# Patient Record
Sex: FEMALE | Race: BLACK OR AFRICAN AMERICAN | NOT HISPANIC OR LATINO
[De-identification: names, ages, dates, MRNs, and addresses within clinical notes are randomized per-mention and may not be internally consistent; named-entity substitution may affect disease eponyms.]

---

## 2017-09-06 ENCOUNTER — APPOINTMENT (OUTPATIENT)
Dept: BARIATRICS | Facility: CLINIC | Age: 57
End: 2017-09-06
Payer: SELF-PAY

## 2017-09-06 VITALS
SYSTOLIC BLOOD PRESSURE: 116 MMHG | HEIGHT: 66 IN | DIASTOLIC BLOOD PRESSURE: 70 MMHG | BODY MASS INDEX: 39.05 KG/M2 | WEIGHT: 243 LBS

## 2017-09-06 PROCEDURE — 99214 OFFICE O/P EST MOD 30 MIN: CPT | Mod: 25

## 2017-09-06 PROCEDURE — S2083 ADJUSTMENT GASTRIC BAND: CPT

## 2018-10-08 ENCOUNTER — NON-APPOINTMENT (OUTPATIENT)
Age: 58
End: 2018-10-08

## 2018-10-08 ENCOUNTER — LABORATORY RESULT (OUTPATIENT)
Age: 58
End: 2018-10-08

## 2018-10-08 ENCOUNTER — APPOINTMENT (OUTPATIENT)
Dept: INTERNAL MEDICINE | Facility: CLINIC | Age: 58
End: 2018-10-08
Payer: COMMERCIAL

## 2018-10-08 VITALS
HEART RATE: 87 BPM | TEMPERATURE: 98.7 F | OXYGEN SATURATION: 98 % | BODY MASS INDEX: 40.34 KG/M2 | RESPIRATION RATE: 14 BRPM | WEIGHT: 251 LBS | DIASTOLIC BLOOD PRESSURE: 80 MMHG | HEIGHT: 66 IN | SYSTOLIC BLOOD PRESSURE: 120 MMHG

## 2018-10-08 PROCEDURE — 99386 PREV VISIT NEW AGE 40-64: CPT | Mod: 25

## 2018-10-08 PROCEDURE — 93000 ELECTROCARDIOGRAM COMPLETE: CPT

## 2018-10-08 NOTE — HEALTH RISK ASSESSMENT
[Patient reported mammogram was normal] : Patient reported mammogram was normal [Patient reported colonoscopy was normal] : Patient reported colonoscopy was normal [MammogramDate] : 06/16 [ColonoscopyDate] : 06/15 [ColonoscopyComments] : repeat 5 yrs [] : No [No falls in past year] : Patient reported no falls in the past year

## 2018-10-08 NOTE — HISTORY OF PRESENT ILLNESS
[FreeTextEntry1] : cpe [de-identified] : Pt is here for cpe. She is upset because her  has Parkinson's. He is a physician and not able to work.

## 2018-10-08 NOTE — HISTORY OF PRESENT ILLNESS
[FreeTextEntry1] : cpe [de-identified] : Pt is here for cpe. She is upset because her  has Parkinson's. He is a physician and not able to work.

## 2018-10-08 NOTE — PHYSICAL EXAM
[de-identified] : tearful when talking about her  [No Acute Distress] : no acute distress [Well Nourished] : well nourished [Well Developed] : well developed [Well-Appearing] : well-appearing [Normal Sclera/Conjunctiva] : normal sclera/conjunctiva [PERRL] : pupils equal round and reactive to light [EOMI] : extraocular movements intact [Normal Outer Ear/Nose] : the outer ears and nose were normal in appearance [Normal Oropharynx] : the oropharynx was normal [Normal TMs] : both tympanic membranes were normal [No JVD] : no jugular venous distention [Supple] : supple [No Lymphadenopathy] : no lymphadenopathy [Thyroid Normal, No Nodules] : the thyroid was normal and there were no nodules present [No Respiratory Distress] : no respiratory distress  [Clear to Auscultation] : lungs were clear to auscultation bilaterally [No Accessory Muscle Use] : no accessory muscle use [Normal Rate] : normal rate  [Regular Rhythm] : with a regular rhythm [Normal S1, S2] : normal S1 and S2 [No Murmur] : no murmur heard [Pedal Pulses Present] : the pedal pulses are present [No Edema] : there was no peripheral edema [Soft] : abdomen soft [Non Tender] : non-tender [No CVA Tenderness] : no CVA  tenderness [No Spinal Tenderness] : no spinal tenderness [No Joint Swelling] : no joint swelling [Grossly Normal Strength/Tone] : grossly normal strength/tone [No Rash] : no rash [Normal Gait] : normal gait [Coordination Grossly Intact] : coordination grossly intact [No Focal Deficits] : no focal deficits [Deep Tendon Reflexes (DTR)] : deep tendon reflexes were 2+ and symmetric [Normal Affect] : the affect was normal [Normal Insight/Judgement] : insight and judgment were intact

## 2018-10-08 NOTE — PHYSICAL EXAM
[de-identified] : tearful when talking about her  [No Acute Distress] : no acute distress [Well Nourished] : well nourished [Well Developed] : well developed [Well-Appearing] : well-appearing [Normal Sclera/Conjunctiva] : normal sclera/conjunctiva [PERRL] : pupils equal round and reactive to light [EOMI] : extraocular movements intact [Normal Outer Ear/Nose] : the outer ears and nose were normal in appearance [Normal Oropharynx] : the oropharynx was normal [Normal TMs] : both tympanic membranes were normal [No JVD] : no jugular venous distention [Supple] : supple [No Lymphadenopathy] : no lymphadenopathy [Thyroid Normal, No Nodules] : the thyroid was normal and there were no nodules present [No Respiratory Distress] : no respiratory distress  [Clear to Auscultation] : lungs were clear to auscultation bilaterally [No Accessory Muscle Use] : no accessory muscle use [Normal Rate] : normal rate  [Regular Rhythm] : with a regular rhythm [Normal S1, S2] : normal S1 and S2 [No Murmur] : no murmur heard [Pedal Pulses Present] : the pedal pulses are present [No Edema] : there was no peripheral edema [Soft] : abdomen soft [Non Tender] : non-tender [No CVA Tenderness] : no CVA  tenderness [No Spinal Tenderness] : no spinal tenderness [No Joint Swelling] : no joint swelling [Grossly Normal Strength/Tone] : grossly normal strength/tone [No Rash] : no rash [Normal Gait] : normal gait [Coordination Grossly Intact] : coordination grossly intact [No Focal Deficits] : no focal deficits [Deep Tendon Reflexes (DTR)] : deep tendon reflexes were 2+ and symmetric [Normal Affect] : the affect was normal [Normal Insight/Judgement] : insight and judgment were intact

## 2018-10-09 LAB
25(OH)D3 SERPL-MCNC: 13.5 NG/ML
ALBUMIN SERPL ELPH-MCNC: 4.3 G/DL
ALP BLD-CCNC: 83 U/L
ALT SERPL-CCNC: 11 U/L
ANION GAP SERPL CALC-SCNC: 14 MMOL/L
APPEARANCE: CLEAR
AST SERPL-CCNC: 19 U/L
BASOPHILS # BLD AUTO: 0.02 K/UL
BASOPHILS NFR BLD AUTO: 0.3 %
BILIRUB SERPL-MCNC: 0.2 MG/DL
BILIRUBIN URINE: NEGATIVE
BLOOD URINE: ABNORMAL
BUN SERPL-MCNC: 12 MG/DL
CALCIUM SERPL-MCNC: 9.9 MG/DL
CHLORIDE SERPL-SCNC: 102 MMOL/L
CHOLEST SERPL-MCNC: 180 MG/DL
CHOLEST/HDLC SERPL: 3.3 RATIO
CO2 SERPL-SCNC: 27 MMOL/L
COLOR: YELLOW
CREAT SERPL-MCNC: 1.03 MG/DL
EOSINOPHIL # BLD AUTO: 0.14 K/UL
EOSINOPHIL NFR BLD AUTO: 2.2 %
FOLATE SERPL-MCNC: 18.5 NG/ML
GLUCOSE QUALITATIVE U: NEGATIVE MG/DL
GLUCOSE SERPL-MCNC: 78 MG/DL
HBA1C MFR BLD HPLC: 5.8 %
HCT VFR BLD CALC: 37.9 %
HCV AB SER QL: NONREACTIVE
HCV S/CO RATIO: 0.07 S/CO
HDLC SERPL-MCNC: 55 MG/DL
HGB BLD-MCNC: 12.3 G/DL
IMM GRANULOCYTES NFR BLD AUTO: 0.2 %
KETONES URINE: ABNORMAL
LDLC SERPL CALC-MCNC: 111 MG/DL
LEUKOCYTE ESTERASE URINE: ABNORMAL
LYMPHOCYTES # BLD AUTO: 2.49 K/UL
LYMPHOCYTES NFR BLD AUTO: 39 %
MAN DIFF?: NORMAL
MCHC RBC-ENTMCNC: 27 PG
MCHC RBC-ENTMCNC: 32.5 GM/DL
MCV RBC AUTO: 83.1 FL
MONOCYTES # BLD AUTO: 0.54 K/UL
MONOCYTES NFR BLD AUTO: 8.5 %
NEUTROPHILS # BLD AUTO: 3.18 K/UL
NEUTROPHILS NFR BLD AUTO: 49.8 %
NITRITE URINE: NEGATIVE
PH URINE: 5.5
PLATELET # BLD AUTO: 285 K/UL
POTASSIUM SERPL-SCNC: 3.7 MMOL/L
PROT SERPL-MCNC: 8.1 G/DL
PROTEIN URINE: NEGATIVE MG/DL
RBC # BLD: 4.56 M/UL
RBC # FLD: 15.2 %
SODIUM SERPL-SCNC: 143 MMOL/L
SPECIFIC GRAVITY URINE: 1.03
TRIGL SERPL-MCNC: 70 MG/DL
TSH SERPL-ACNC: 1.25 UIU/ML
URATE SERPL-MCNC: 7 MG/DL
UROBILINOGEN URINE: NEGATIVE MG/DL
VIT B12 SERPL-MCNC: 861 PG/ML
WBC # FLD AUTO: 6.38 K/UL

## 2018-10-12 ENCOUNTER — TRANSCRIPTION ENCOUNTER (OUTPATIENT)
Age: 58
End: 2018-10-12

## 2018-10-15 ENCOUNTER — APPOINTMENT (OUTPATIENT)
Dept: INTERNAL MEDICINE | Facility: CLINIC | Age: 58
End: 2018-10-15

## 2018-10-17 LAB — HEMOCCULT STL QL IA: NEGATIVE

## 2018-10-26 ENCOUNTER — APPOINTMENT (OUTPATIENT)
Dept: INTERNAL MEDICINE | Facility: CLINIC | Age: 58
End: 2018-10-26

## 2018-10-29 ENCOUNTER — MEDICATION RENEWAL (OUTPATIENT)
Age: 58
End: 2018-10-29

## 2018-11-16 ENCOUNTER — APPOINTMENT (OUTPATIENT)
Dept: INTERNAL MEDICINE | Facility: CLINIC | Age: 58
End: 2018-11-16

## 2018-11-16 ENCOUNTER — MEDICATION RENEWAL (OUTPATIENT)
Age: 58
End: 2018-11-16

## 2018-12-14 ENCOUNTER — RX RENEWAL (OUTPATIENT)
Age: 58
End: 2018-12-14

## 2019-01-25 ENCOUNTER — APPOINTMENT (OUTPATIENT)
Dept: INTERNAL MEDICINE | Facility: CLINIC | Age: 59
End: 2019-01-25

## 2019-01-30 ENCOUNTER — MEDICATION RENEWAL (OUTPATIENT)
Age: 59
End: 2019-01-30

## 2019-01-30 ENCOUNTER — TRANSCRIPTION ENCOUNTER (OUTPATIENT)
Age: 59
End: 2019-01-30

## 2019-02-04 ENCOUNTER — TRANSCRIPTION ENCOUNTER (OUTPATIENT)
Age: 59
End: 2019-02-04

## 2019-02-26 ENCOUNTER — RX RENEWAL (OUTPATIENT)
Age: 59
End: 2019-02-26

## 2019-03-01 ENCOUNTER — APPOINTMENT (OUTPATIENT)
Dept: INTERNAL MEDICINE | Facility: CLINIC | Age: 59
End: 2019-03-01

## 2019-03-05 ENCOUNTER — TRANSCRIPTION ENCOUNTER (OUTPATIENT)
Age: 59
End: 2019-03-05

## 2019-03-11 ENCOUNTER — MEDICATION RENEWAL (OUTPATIENT)
Age: 59
End: 2019-03-11

## 2019-03-15 ENCOUNTER — APPOINTMENT (OUTPATIENT)
Dept: INTERNAL MEDICINE | Facility: CLINIC | Age: 59
End: 2019-03-15

## 2019-03-20 ENCOUNTER — APPOINTMENT (OUTPATIENT)
Dept: INTERNAL MEDICINE | Facility: CLINIC | Age: 59
End: 2019-03-20
Payer: COMMERCIAL

## 2019-03-20 VITALS
WEIGHT: 218 LBS | SYSTOLIC BLOOD PRESSURE: 114 MMHG | OXYGEN SATURATION: 97 % | RESPIRATION RATE: 14 BRPM | HEART RATE: 84 BPM | HEIGHT: 66 IN | TEMPERATURE: 98.4 F | BODY MASS INDEX: 35.03 KG/M2 | DIASTOLIC BLOOD PRESSURE: 72 MMHG

## 2019-03-20 DIAGNOSIS — G47.00 INSOMNIA, UNSPECIFIED: ICD-10-CM

## 2019-03-20 PROCEDURE — 99214 OFFICE O/P EST MOD 30 MIN: CPT

## 2019-03-20 RX ORDER — TRAZODONE HYDROCHLORIDE 50 MG/1
50 TABLET ORAL
Qty: 30 | Refills: 2 | Status: DISCONTINUED | COMMUNITY
Start: 2019-03-11 | End: 2019-03-20

## 2019-03-20 NOTE — ASSESSMENT
[FreeTextEntry1] : obesity\par losing weight with weight watchers and walking with coworkers\par \par anxiety \par much improved\par \par insomnia\par wants lorazepam renewed\par \par HTN\par lower BP medication

## 2019-03-20 NOTE — HISTORY OF PRESENT ILLNESS
[FreeTextEntry1] : anxiety [de-identified] : Pt is here for f/u. She has anxiety and seeing a therapist which is really helping her. She has trouble sleeping and trazodone did not help. Zoloft is really helping a lot. At work, she is fine. When she goes home, she feels anxious. Does not have lorazepam anymore. \par She has lost weight and her BP has improved.

## 2019-03-20 NOTE — PHYSICAL EXAM
[Well Nourished] : well nourished [No Acute Distress] : no acute distress [Well Developed] : well developed [Well-Appearing] : well-appearing [No Respiratory Distress] : no respiratory distress  [Clear to Auscultation] : lungs were clear to auscultation bilaterally [Normal Rate] : normal rate  [Regular Rhythm] : with a regular rhythm [No Edema] : there was no peripheral edema [Soft] : abdomen soft [Non Tender] : non-tender [Normal Affect] : the affect was normal [Normal Insight/Judgement] : insight and judgment were intact

## 2019-03-22 LAB
ALBUMIN SERPL ELPH-MCNC: 4.2 G/DL
ALP BLD-CCNC: 74 U/L
ALT SERPL-CCNC: 13 U/L
ANION GAP SERPL CALC-SCNC: 13 MMOL/L
AST SERPL-CCNC: 21 U/L
BILIRUB SERPL-MCNC: 0.2 MG/DL
BUN SERPL-MCNC: 3 MG/DL
CALCIUM SERPL-MCNC: 9.5 MG/DL
CHLORIDE SERPL-SCNC: 98 MMOL/L
CHOLEST SERPL-MCNC: 145 MG/DL
CHOLEST/HDLC SERPL: 2.7 RATIO
CO2 SERPL-SCNC: 26 MMOL/L
CREAT SERPL-MCNC: 0.87 MG/DL
GLUCOSE SERPL-MCNC: 83 MG/DL
HBA1C MFR BLD HPLC: 5.5 %
HDLC SERPL-MCNC: 54 MG/DL
LDLC SERPL CALC-MCNC: 80 MG/DL
POTASSIUM SERPL-SCNC: 4 MMOL/L
PROT SERPL-MCNC: 7.1 G/DL
SODIUM SERPL-SCNC: 137 MMOL/L
TRIGL SERPL-MCNC: 54 MG/DL

## 2019-04-01 ENCOUNTER — APPOINTMENT (OUTPATIENT)
Dept: INTERNAL MEDICINE | Facility: CLINIC | Age: 59
End: 2019-04-01

## 2019-04-29 ENCOUNTER — RX RENEWAL (OUTPATIENT)
Age: 59
End: 2019-04-29

## 2019-05-14 ENCOUNTER — RX RENEWAL (OUTPATIENT)
Age: 59
End: 2019-05-14

## 2019-06-15 ENCOUNTER — RX RENEWAL (OUTPATIENT)
Age: 59
End: 2019-06-15

## 2019-07-12 ENCOUNTER — TRANSCRIPTION ENCOUNTER (OUTPATIENT)
Age: 59
End: 2019-07-12

## 2019-07-12 ENCOUNTER — MEDICATION RENEWAL (OUTPATIENT)
Age: 59
End: 2019-07-12

## 2019-07-16 ENCOUNTER — RX RENEWAL (OUTPATIENT)
Age: 59
End: 2019-07-16

## 2019-08-16 ENCOUNTER — RX RENEWAL (OUTPATIENT)
Age: 59
End: 2019-08-16

## 2019-09-05 ENCOUNTER — TRANSCRIPTION ENCOUNTER (OUTPATIENT)
Age: 59
End: 2019-09-05

## 2019-11-04 ENCOUNTER — RX RENEWAL (OUTPATIENT)
Age: 59
End: 2019-11-04

## 2019-12-02 ENCOUNTER — RX RENEWAL (OUTPATIENT)
Age: 59
End: 2019-12-02

## 2019-12-30 ENCOUNTER — APPOINTMENT (OUTPATIENT)
Dept: INTERNAL MEDICINE | Facility: CLINIC | Age: 59
End: 2019-12-30

## 2019-12-31 ENCOUNTER — NON-APPOINTMENT (OUTPATIENT)
Age: 59
End: 2019-12-31

## 2019-12-31 ENCOUNTER — APPOINTMENT (OUTPATIENT)
Dept: FAMILY MEDICINE | Facility: CLINIC | Age: 59
End: 2019-12-31
Payer: COMMERCIAL

## 2019-12-31 VITALS
OXYGEN SATURATION: 97 % | DIASTOLIC BLOOD PRESSURE: 80 MMHG | HEIGHT: 65 IN | WEIGHT: 222 LBS | RESPIRATION RATE: 14 BRPM | TEMPERATURE: 98.2 F | SYSTOLIC BLOOD PRESSURE: 118 MMHG | BODY MASS INDEX: 36.99 KG/M2 | HEART RATE: 87 BPM

## 2019-12-31 PROCEDURE — 99396 PREV VISIT EST AGE 40-64: CPT | Mod: 25

## 2019-12-31 PROCEDURE — 36415 COLL VENOUS BLD VENIPUNCTURE: CPT

## 2019-12-31 PROCEDURE — 93000 ELECTROCARDIOGRAM COMPLETE: CPT

## 2019-12-31 NOTE — PHYSICAL EXAM
[Well Nourished] : well nourished [No Acute Distress] : no acute distress [Well-Appearing] : well-appearing [Well Developed] : well developed [Normal Sclera/Conjunctiva] : normal sclera/conjunctiva [PERRL] : pupils equal round and reactive to light [EOMI] : extraocular movements intact [Normal Outer Ear/Nose] : the outer ears and nose were normal in appearance [Normal Oropharynx] : the oropharynx was normal [No JVD] : no jugular venous distention [No Lymphadenopathy] : no lymphadenopathy [Supple] : supple [Thyroid Normal, No Nodules] : the thyroid was normal and there were no nodules present [No Respiratory Distress] : no respiratory distress  [No Accessory Muscle Use] : no accessory muscle use [Clear to Auscultation] : lungs were clear to auscultation bilaterally [Normal Rate] : normal rate  [Regular Rhythm] : with a regular rhythm [No Murmur] : no murmur heard [Normal S1, S2] : normal S1 and S2 [No Carotid Bruits] : no carotid bruits [No Abdominal Bruit] : a ~M bruit was not heard ~T in the abdomen [No Varicosities] : no varicosities [Pedal Pulses Present] : the pedal pulses are present [No Edema] : there was no peripheral edema [No Palpable Aorta] : no palpable aorta [No Extremity Clubbing/Cyanosis] : no extremity clubbing/cyanosis [Soft] : abdomen soft [Non Tender] : non-tender [Non-distended] : non-distended [No Masses] : no abdominal mass palpated [Normal Bowel Sounds] : normal bowel sounds [Normal Posterior Cervical Nodes] : no posterior cervical lymphadenopathy [No HSM] : no HSM [No CVA Tenderness] : no CVA  tenderness [Normal Anterior Cervical Nodes] : no anterior cervical lymphadenopathy [No Spinal Tenderness] : no spinal tenderness [No Joint Swelling] : no joint swelling [Grossly Normal Strength/Tone] : grossly normal strength/tone [No Rash] : no rash [Coordination Grossly Intact] : coordination grossly intact [Deep Tendon Reflexes (DTR)] : deep tendon reflexes were 2+ and symmetric [No Focal Deficits] : no focal deficits [Normal Gait] : normal gait [Normal Affect] : the affect was normal [Normal Insight/Judgement] : insight and judgment were intact

## 2019-12-31 NOTE — HISTORY OF PRESENT ILLNESS
[de-identified] : Pt presenting for CPE. Pt complaining of upper/mid back pain that is worse primarily after long periods of standing. Pt takes tylenol which helps somewhat. \par \par \par

## 2019-12-31 NOTE — HEALTH RISK ASSESSMENT
[Patient reported mammogram was normal] : Patient reported mammogram was normal [Patient reported PAP Smear was normal] : Patient reported PAP Smear was normal [Patient reported colonoscopy was normal] : Patient reported colonoscopy was normal [MammogramDate] : 08/19 [ColonoscopyDate] : 01/15 [PapSmearDate] : 05/19

## 2019-12-31 NOTE — PLAN
[FreeTextEntry1] : HCM: \par -check labs\par -EKG NSR \par -UTD with mammo, pap, colonoscopy \par -declining flu vaccine\par \par Back pain: \par -may be from large breasts\par -advise NSAIDs PRN\par -PT

## 2020-01-02 LAB
25(OH)D3 SERPL-MCNC: 49.9 NG/ML
ALBUMIN SERPL ELPH-MCNC: 4.1 G/DL
ALP BLD-CCNC: 122 U/L
ALT SERPL-CCNC: 13 U/L
ANION GAP SERPL CALC-SCNC: 9 MMOL/L
AST SERPL-CCNC: 15 U/L
BASOPHILS # BLD AUTO: 0.03 K/UL
BASOPHILS NFR BLD AUTO: 0.6 %
BILIRUB SERPL-MCNC: <0.2 MG/DL
BUN SERPL-MCNC: 18 MG/DL
CALCIUM SERPL-MCNC: 9.2 MG/DL
CHLORIDE SERPL-SCNC: 104 MMOL/L
CHOLEST SERPL-MCNC: 205 MG/DL
CHOLEST/HDLC SERPL: 3 RATIO
CO2 SERPL-SCNC: 29 MMOL/L
CREAT SERPL-MCNC: 0.99 MG/DL
EOSINOPHIL # BLD AUTO: 0.09 K/UL
EOSINOPHIL NFR BLD AUTO: 1.8 %
ESTIMATED AVERAGE GLUCOSE: 108 MG/DL
FOLATE SERPL-MCNC: 8.6 NG/ML
GLUCOSE SERPL-MCNC: 89 MG/DL
HBA1C MFR BLD HPLC: 5.4 %
HCT VFR BLD CALC: 37.5 %
HDLC SERPL-MCNC: 69 MG/DL
HGB BLD-MCNC: 11.6 G/DL
IMM GRANULOCYTES NFR BLD AUTO: 0.2 %
LDLC SERPL CALC-MCNC: 123 MG/DL
LYMPHOCYTES # BLD AUTO: 1.98 K/UL
LYMPHOCYTES NFR BLD AUTO: 38.7 %
MAN DIFF?: NORMAL
MCHC RBC-ENTMCNC: 26.2 PG
MCHC RBC-ENTMCNC: 30.9 GM/DL
MCV RBC AUTO: 84.8 FL
MONOCYTES # BLD AUTO: 0.34 K/UL
MONOCYTES NFR BLD AUTO: 6.7 %
NEUTROPHILS # BLD AUTO: 2.66 K/UL
NEUTROPHILS NFR BLD AUTO: 52 %
PLATELET # BLD AUTO: 252 K/UL
POTASSIUM SERPL-SCNC: 4.4 MMOL/L
PROT SERPL-MCNC: 6.8 G/DL
RBC # BLD: 4.42 M/UL
RBC # FLD: 15.7 %
SODIUM SERPL-SCNC: 142 MMOL/L
TRIGL SERPL-MCNC: 63 MG/DL
TSH SERPL-ACNC: 2.64 UIU/ML
VIT B12 SERPL-MCNC: >2000 PG/ML
WBC # FLD AUTO: 5.11 K/UL

## 2020-01-11 ENCOUNTER — RX RENEWAL (OUTPATIENT)
Age: 60
End: 2020-01-11

## 2020-04-05 ENCOUNTER — RX RENEWAL (OUTPATIENT)
Age: 60
End: 2020-04-05

## 2020-04-24 ENCOUNTER — RX RENEWAL (OUTPATIENT)
Age: 60
End: 2020-04-24

## 2020-06-22 ENCOUNTER — RX RENEWAL (OUTPATIENT)
Age: 60
End: 2020-06-22

## 2020-06-28 ENCOUNTER — TRANSCRIPTION ENCOUNTER (OUTPATIENT)
Age: 60
End: 2020-06-28

## 2020-07-29 ENCOUNTER — RX RENEWAL (OUTPATIENT)
Age: 60
End: 2020-07-29

## 2020-07-31 ENCOUNTER — APPOINTMENT (OUTPATIENT)
Dept: INTERNAL MEDICINE | Facility: CLINIC | Age: 60
End: 2020-07-31

## 2020-08-03 ENCOUNTER — OUTPATIENT (OUTPATIENT)
Dept: OUTPATIENT SERVICES | Facility: HOSPITAL | Age: 60
LOS: 1 days | End: 2020-08-03
Payer: COMMERCIAL

## 2020-08-03 ENCOUNTER — APPOINTMENT (OUTPATIENT)
Dept: INTERNAL MEDICINE | Facility: CLINIC | Age: 60
End: 2020-08-03

## 2020-08-03 ENCOUNTER — NON-APPOINTMENT (OUTPATIENT)
Age: 60
End: 2020-08-03

## 2020-08-03 DIAGNOSIS — Z98.84 BARIATRIC SURGERY STATUS: Chronic | ICD-10-CM

## 2020-08-03 DIAGNOSIS — E66.9 OBESITY, UNSPECIFIED: ICD-10-CM

## 2020-08-03 DIAGNOSIS — Z98.89 OTHER SPECIFIED POSTPROCEDURAL STATES: Chronic | ICD-10-CM

## 2020-08-03 DIAGNOSIS — Z11.59 ENCOUNTER FOR SCREENING FOR OTHER VIRAL DISEASES: ICD-10-CM

## 2020-08-03 DIAGNOSIS — Z98.42 CATARACT EXTRACTION STATUS, LEFT EYE: Chronic | ICD-10-CM

## 2020-08-03 DIAGNOSIS — Z98.84 BARIATRIC SURGERY STATUS: ICD-10-CM

## 2020-08-03 PROCEDURE — U0003: CPT

## 2020-08-04 LAB — SARS-COV-2 RNA SPEC QL NAA+PROBE: SIGNIFICANT CHANGE UP

## 2020-08-05 ENCOUNTER — RESULT REVIEW (OUTPATIENT)
Age: 60
End: 2020-08-05

## 2020-08-05 ENCOUNTER — APPOINTMENT (OUTPATIENT)
Dept: BARIATRICS | Facility: CLINIC | Age: 60
End: 2020-08-05
Payer: COMMERCIAL

## 2020-08-05 ENCOUNTER — OUTPATIENT (OUTPATIENT)
Dept: OUTPATIENT SERVICES | Facility: HOSPITAL | Age: 60
LOS: 1 days | End: 2020-08-05
Payer: COMMERCIAL

## 2020-08-05 VITALS
DIASTOLIC BLOOD PRESSURE: 82 MMHG | HEIGHT: 65 IN | SYSTOLIC BLOOD PRESSURE: 118 MMHG | WEIGHT: 259.48 LBS | BODY MASS INDEX: 43.23 KG/M2 | OXYGEN SATURATION: 99 % | HEART RATE: 81 BPM

## 2020-08-05 DIAGNOSIS — Z82.49 FAMILY HISTORY OF ISCHEMIC HEART DISEASE AND OTHER DISEASES OF THE CIRCULATORY SYSTEM: ICD-10-CM

## 2020-08-05 DIAGNOSIS — E66.9 OBESITY, UNSPECIFIED: ICD-10-CM

## 2020-08-05 DIAGNOSIS — Z98.84 BARIATRIC SURGERY STATUS: ICD-10-CM

## 2020-08-05 DIAGNOSIS — Z98.84 BARIATRIC SURGERY STATUS: Chronic | ICD-10-CM

## 2020-08-05 DIAGNOSIS — Z98.42 CATARACT EXTRACTION STATUS, LEFT EYE: Chronic | ICD-10-CM

## 2020-08-05 DIAGNOSIS — Z98.89 OTHER SPECIFIED POSTPROCEDURAL STATES: Chronic | ICD-10-CM

## 2020-08-05 DIAGNOSIS — Z87.19 PERSONAL HISTORY OF OTHER DISEASES OF THE DIGESTIVE SYSTEM: ICD-10-CM

## 2020-08-05 PROCEDURE — 99215 OFFICE O/P EST HI 40 MIN: CPT

## 2020-08-05 PROCEDURE — 74220 X-RAY XM ESOPHAGUS 1CNTRST: CPT | Mod: 26

## 2020-08-05 PROCEDURE — 74220 X-RAY XM ESOPHAGUS 1CNTRST: CPT

## 2020-08-05 NOTE — PHYSICAL EXAM
[Obese, well nourished, in no acute distress] : obese, well nourished, in no acute distress [Normal] : affect appropriate [de-identified] : obese, soft, nontender, no evidence of hernia, port palpable, diastasis

## 2020-08-05 NOTE — HISTORY OF PRESENT ILLNESS
[Procedure: ___] : Procedure performed: [unfilled]  [Date of Surgery: ___] : Date of Surgery:   [unfilled] [Surgeon Name:   ___] : Surgeon Name: Dr. NESS [Pre-Op Weight ___] : Pre-op weight was [unfilled] lbs [de-identified] : 59 year old woman  with long-standing history of morbid obesity status post Lap Band 2005. Patient has had overall modest weight loss with preoperative weight 250, lowest 135 and currently at 259.  Patient has multiple obesity related medical problems and realizes that weight loss would improve her  health.  Patient feels that she will be unable to lose and maintain weight loss with LAP-BAND  and presents today to discuss possible conversion to laparoscopic sleeve gastrectomy. Video esophagram done today revealed no evidence of prolapse or obstruction. No fluid in the band at this time.

## 2020-08-05 NOTE — ASSESSMENT
[FreeTextEntry1] : 59 year old woman with long-standing history of morbid obesity status post Lap Band 2005. Patient had overall modest weight loss. Patient feels that she will be unable to lose and maintain weight loss with Lap Band alone and presents today to discuss options for further weight loss surgery.  \par \par I had an extensive discussion with the patient reviewing the Laparoscopic Adjustable Gastric Band, Laparoscopic Sleeve Gastrectomy, and Laparoscopic Gastric Bypass.   Diagrams were used.  All questions were answered.\par \par Complications were discussed including but not limited to: dumping, vitamin and protein deficiencies, pneumonia, urinary infection, wound infection, leaks/peritonitis possibly requiring intraabdominal drains or reoperation, bleeding, DVT, pulmonary embolus, reflux, sleeve stricture, anastomotic ulcer, anastomotic stricture, abdominal wall hernias, internal hernias,  revisions, death, inadequate weight loss.  Increased risk of revisional surgery was also discussed. The importance of vitamins and protein was stressed, as was the importance of follow-up.  \par \par Patient understands she needs to make significant dietary and lifestyle changes in order to lose and maintain weight loss in addition to further weight loss surgery.\par \par Patient feels that her weight is adversely affecting her health and that she will be unable to lose and maintain weight loss on her  own and is now interested in pursuing the Laparoscopic Sleeve Gastrectomy.  Patient understands that if intraoperative conditions are unfavorable Laparoscopic Gastric Bypass may be more appropriate and that there is a small chance that she would not have any weight loss procedure. Patient understands that there is a higher risk of complications with revisional surgery and wishes to proceed.  Patient also understands that single-stage conversion from Lap Band to Laparoscopic Sleeve Gastrectomy is associated with a higher risk of complications as compared to  a two-stage procedure i.e. removing the lap band and then coming back at a later date to perform a sleeve gastrectomy.\par \par She was given written material to review.  Pre-operative evaluations were reviewed.  She will need to lose weight prior to surgery and will be seen again prior to surgery. Once her pre-operative evaluations are completed she will be schedule for surgery.  She was told to call with any questions.\par

## 2020-08-05 NOTE — REASON FOR VISIT
[Initial Consult] : an initial consult for [Morbid Obesity (BMI>40)] : morbid obesity (bmi>40) [S/P Bariatric Surgery] : s/p bariatric surgery

## 2020-08-10 ENCOUNTER — TRANSCRIPTION ENCOUNTER (OUTPATIENT)
Age: 60
End: 2020-08-10

## 2020-08-24 ENCOUNTER — APPOINTMENT (OUTPATIENT)
Dept: INTERNAL MEDICINE | Facility: CLINIC | Age: 60
End: 2020-08-24
Payer: COMMERCIAL

## 2020-08-24 PROCEDURE — 99213 OFFICE O/P EST LOW 20 MIN: CPT | Mod: 95

## 2020-08-24 NOTE — HISTORY OF PRESENT ILLNESS
[Home] : at home, [unfilled] , at the time of the visit. [Medical Office: ([facility])___] : at the medical office located in  [Verbal consent obtained from patient] : the patient, [unfilled] [FreeTextEntry8] : Pt c/o weight gain with the quarantine. She has a lap band. Dr Azevedo recommends that she have lap band removed and have gastric sleeve procedure. \par Pt states that she eats healthy, but tends to eat large portions. 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

## 2020-08-31 LAB
25(OH)D3 SERPL-MCNC: 41.2 NG/ML
ALBUMIN SERPL ELPH-MCNC: 4.2 G/DL
ALP BLD-CCNC: 101 U/L
ALT SERPL-CCNC: 30 U/L
ANION GAP SERPL CALC-SCNC: 12 MMOL/L
AST SERPL-CCNC: 25 U/L
BASOPHILS # BLD AUTO: 0.06 K/UL
BASOPHILS NFR BLD AUTO: 1.4 %
BILIRUB SERPL-MCNC: 0.2 MG/DL
BUN SERPL-MCNC: 10 MG/DL
CALCIUM SERPL-MCNC: 9.7 MG/DL
CHLORIDE SERPL-SCNC: 103 MMOL/L
CHOLEST SERPL-MCNC: 214 MG/DL
CHOLEST/HDLC SERPL: 3.1 RATIO
CO2 SERPL-SCNC: 29 MMOL/L
CREAT SERPL-MCNC: 1.04 MG/DL
EOSINOPHIL # BLD AUTO: 0.12 K/UL
EOSINOPHIL NFR BLD AUTO: 2.9 %
ESTIMATED AVERAGE GLUCOSE: 108 MG/DL
FERRITIN SERPL-MCNC: 36 NG/ML
FOLATE SERPL-MCNC: 10.1 NG/ML
GLUCOSE SERPL-MCNC: 90 MG/DL
HBA1C MFR BLD HPLC: 5.4 %
HCT VFR BLD CALC: 37.9 %
HDLC SERPL-MCNC: 68 MG/DL
HGB BLD-MCNC: 11.5 G/DL
IMM GRANULOCYTES NFR BLD AUTO: 0.7 %
INR PPP: 0.96 RATIO
IRON SATN MFR SERPL: 15 %
IRON SERPL-MCNC: 46 UG/DL
LDLC SERPL CALC-MCNC: 125 MG/DL
LYMPHOCYTES # BLD AUTO: 1.35 K/UL
LYMPHOCYTES NFR BLD AUTO: 32.4 %
MAN DIFF?: NORMAL
MCHC RBC-ENTMCNC: 26.4 PG
MCHC RBC-ENTMCNC: 30.3 GM/DL
MCV RBC AUTO: 86.9 FL
MONOCYTES # BLD AUTO: 0.33 K/UL
MONOCYTES NFR BLD AUTO: 7.9 %
NEUTROPHILS # BLD AUTO: 2.28 K/UL
NEUTROPHILS NFR BLD AUTO: 54.7 %
PLATELET # BLD AUTO: 217 K/UL
POTASSIUM SERPL-SCNC: 4.3 MMOL/L
PROT SERPL-MCNC: 6.7 G/DL
PT BLD: 11.5 SEC
RBC # BLD: 4.36 M/UL
RBC # FLD: 15.2 %
SODIUM SERPL-SCNC: 144 MMOL/L
T4 FREE SERPL-MCNC: 0.9 NG/DL
TIBC SERPL-MCNC: 311 UG/DL
TRIGL SERPL-MCNC: 102 MG/DL
TSH SERPL-ACNC: 2.92 UIU/ML
UIBC SERPL-MCNC: 264 UG/DL
VIT B12 SERPL-MCNC: 1320 PG/ML
WBC # FLD AUTO: 4.17 K/UL

## 2020-09-03 ENCOUNTER — APPOINTMENT (OUTPATIENT)
Dept: BARIATRICS | Facility: CLINIC | Age: 60
End: 2020-09-03
Payer: COMMERCIAL

## 2020-09-03 VITALS — BODY MASS INDEX: 42.82 KG/M2 | HEIGHT: 65 IN | WEIGHT: 257 LBS

## 2020-09-03 PROCEDURE — 99214 OFFICE O/P EST MOD 30 MIN: CPT | Mod: 95

## 2020-09-04 NOTE — REVIEW OF SYSTEMS
[Recent Change In Weight] : ~T recent weight change [Negative] : Neurological [Dysphagia] : no dysphagia [Chest Pain] : no chest pain [Shortness Of Breath] : no shortness of breath [Wheezing] : no wheezing [SOB on Exertion] : no shortness of breath during exertion [Cough] : no cough [Abdominal Pain] : no abdominal pain [Vomiting] : no vomiting [Constipation] : no constipation [Diarrhea] : no diarrhea [Reflux/Heartburn] : no reflux/ heartburn [FreeTextEntry2] : weight loss since last visit.  [Hernia] : no hernia

## 2020-09-04 NOTE — HISTORY OF PRESENT ILLNESS
[Procedure: ___] : Procedure performed: [unfilled]  [Date of Surgery: ___] : Date of Surgery:   [unfilled] [Surgeon Name:   ___] : Surgeon Name: Dr. NESS [Pre-Op Weight ___] : Pre-op weight was [unfilled] lbs [Home] : at home, [unfilled] , at the time of the visit. [Medical Office: ([facility])___] : at the medical office located in  [Verbal consent obtained from patient] : the patient, [unfilled] [de-identified] : 59 year old F undergoing workup for a single stage conversion - removal of lap band and port converted to laparoscopic sleeve gastrectomy here for a TELEMEDICINE WEIGH IN VISIT.Currently lap band is empty. Weight loss since last visit. Current weight - 257 lbs  Patient is currently in the process of completing her workup as well as a medically supervised diet. Patient continues to make efforts to improve food choices and increased activity.Pt is following a protein focused diet - currently consuming 2 meals a day / skipping breakfast / consuming 4-5 servings of fruit per day - occasionally at night- consuming a sufficient amount of zero calorie liquid.  Patient knows she needs to lose weight prior to surgery. Plan to increase daily exercise incorporating cardio- walking minimally and needs to increase daily  strength training. All questions were answered. Discussed and reviewed further requirements needed prior to scheduling surgery. Pt is requesting to discuss and review recent labs performed. \par

## 2020-09-04 NOTE — ASSESSMENT
[FreeTextEntry1] : 59 year old F undergoing workup for a single stage conversion - removal of lap band and port converted to laparoscopic sleeve gastrectomy here for a TELEMEDICINE WEIGH IN VISIT.Currently lap band is empty. Weight loss since last visit. Current weight - 257 lbs  Patient is currently in the process of completing her workup as well as a medically supervised diet.Hx of maladaptive eating patterns/ History  of skipping meals breakfast + ARCHANA - plan to follow up - currently not utilizing a CPAP at night. \par \par Pre op education classes completed. \par Nutrition one on one 9/22/2020  Psych -10/15/2020 Discussed following 3 meal a day regimen- consuming a protein shake as a meal replacement if needed/ decreasing daily fruit intake- no more than 2 servings of fruit per day/ lean protein .\par 4 additional weigh in visits prior to surgery-  October, November ,December, January 2021.. Patient is aware she needs to lose weight prior to surgery. \par Scheduled  GI consult- October 2020 and subsequent Upper EGD. \par Needs letter of support/ diet history \par Discussed recent routine labs performed.\par Appointments  and testing with cardiology /pulmonary scheduled. \par \par Nutritional counseling has been provided. The patient is encouraged to remain calorie conscious and continue a low fat, low carbohydrate, protein focus diet. Pt encouraged to participate in a daily exercise regimen incorporating cardio and strength training. Pt encouraged to follow a 3 meal a day regimen/ Decrease fruit consumption - 2 servings per day. \par \par Return to office in 4  weeks or earlier if any concerns. \par

## 2020-09-10 LAB
VIT A SERPL-MCNC: 52.4 UG/DL
VIT B1 SERPL-MCNC: 107.4 NMOL/L
VIT B2 SERPL-MCNC: 212 UG/L
VIT B6 SERPL-MCNC: 28.2 UG/L
ZINC SERPL-MCNC: 74 UG/DL

## 2020-09-22 ENCOUNTER — APPOINTMENT (OUTPATIENT)
Dept: BARIATRICS/WEIGHT MGMT | Facility: CLINIC | Age: 60
End: 2020-09-22
Payer: COMMERCIAL

## 2020-09-22 VITALS — WEIGHT: 258 LBS | HEIGHT: 65 IN | BODY MASS INDEX: 42.99 KG/M2

## 2020-09-22 PROCEDURE — 97802 MEDICAL NUTRITION INDIV IN: CPT | Mod: 95

## 2020-10-01 ENCOUNTER — APPOINTMENT (OUTPATIENT)
Dept: BARIATRICS | Facility: CLINIC | Age: 60
End: 2020-10-01
Payer: COMMERCIAL

## 2020-10-01 VITALS — WEIGHT: 259 LBS | BODY MASS INDEX: 43.15 KG/M2 | HEIGHT: 65 IN

## 2020-10-01 PROCEDURE — 99214 OFFICE O/P EST MOD 30 MIN: CPT | Mod: 95

## 2020-10-03 ENCOUNTER — TRANSCRIPTION ENCOUNTER (OUTPATIENT)
Age: 60
End: 2020-10-03

## 2020-10-03 ENCOUNTER — RX RENEWAL (OUTPATIENT)
Age: 60
End: 2020-10-03

## 2020-10-05 ENCOUNTER — RX RENEWAL (OUTPATIENT)
Age: 60
End: 2020-10-05

## 2020-10-07 NOTE — HISTORY OF PRESENT ILLNESS
[Procedure: ___] : Procedure performed: [unfilled]  [Date of Surgery: ___] : Date of Surgery:   [unfilled] [Surgeon Name:   ___] : Surgeon Name: Dr. NESS [Pre-Op Weight ___] : Pre-op weight was [unfilled] lbs [Home] : at home, [unfilled] , at the time of the visit. [Medical Office: ([facility])___] : at the medical office located in  [Verbal consent obtained from patient] : the patient, [unfilled] [de-identified] : 60 year old F undergoing workup for a single stage conversion - removal of lap band and port converted to laparoscopic sleeve gastrectomy here for a TELEMEDICINE WEIGH IN VISIT.Currently lap band is empty. Weight stable  since last visit. Current weight - 259 lbs. Pt believes she gained  > 30 lbs from March 2020- August 2020 - during the peak of COVID Pandemic.  Patient is currently in the process of completing her workup as well as a medically supervised diet. History of maladaptive behavior and eating patterns - napping at night prior to dinner - eating dinner between 8-9 PM / snacking between meals and hx of backloading calories during the day. Patient continues to work on making better food choices not skipping breakfast since last visit / consuming  ~ 1-2  servings of fruit per day / continues to work on snacking less at night . Pt believes she consuming a sufficient amount of zero calorie liquid.  Patient knows she needs to lose weight prior to surgery. Plan to increase daily exercise incorporating cardio- walking minimally and needs to increase daily  strength training. All questions were answered. Discussed and reviewed further requirements needed prior to scheduling surgery. Pt is requesting to discuss and review recent labs performed. Follow up with pulmonologist scheduled on 10/19/20 to be evaluated for Obstructive Sleep Apnea. \par

## 2020-10-07 NOTE — ASSESSMENT
[FreeTextEntry1] : 60 year old F undergoing workup for a single stage conversion - removal of lap band and port converted to laparoscopic sleeve gastrectomy here for a TELEMEDICINE WEIGH IN VISIT.Currently lap band is empty. Weight stable since last visit. Current weight - 257 lbs . History of weight gain from March 2020- August 2020. Patient is currently in the process of completing her workup as well as a medically supervised diet.Hx of maladaptive eating patterns/ History  of skipping meals breakfast + ARCHANA - plan to follow up with pulmonologist in 2 weeks- currently not utilizing a CPAP at night. \par \par Scheduled pulmonary follow up on  10/19/20 will discuss  Obstructive Sleep Apnea / evaluated for CPAP parameters. \par \par Nutrition one on one 9/22/2020  Psych -10/15/2020 Discussed following 3 meal a day regimen- consuming a protein shake as a meal replacement if needed/ decreasing daily fruit intake- no more than 2 servings of fruit per day/ lean protein. Discussed with patient avoiding processed foods/saturated fats/ refined carbohydrates and importance of front loading calories. \par \par 3 additional weigh in visits prior to surgery-  November ,December, January 2021. Patient is aware she needs to lose weight prior to surgery. \par Scheduled  GI consult- October 2020 and subsequent Upper EGD. \par Needs letter of support/ diet history \par Appointments  and testing with cardiology /pulmonary scheduled. \par \par Nutritional counseling has been provided. The patient is encouraged to remain calorie conscious and continue a low fat, low carbohydrate, protein focus diet. Pt encouraged to participate in a daily exercise regimen incorporating cardio and strength training. Pt encouraged to follow a 3 meal a day regimen/ Decrease fruit consumption - 2 servings per day. \par \par Return to office in 4  weeks or earlier if any concerns. \par

## 2020-10-07 NOTE — REVIEW OF SYSTEMS
[Fatigue] : fatigue [Negative] : Heme/Lymph [Recent Change In Weight] : ~T no recent weight change [Dysphagia] : no dysphagia [Chest Pain] : no chest pain [Shortness Of Breath] : no shortness of breath [Wheezing] : no wheezing [Cough] : no cough [SOB on Exertion] : no shortness of breath during exertion [Abdominal Pain] : no abdominal pain [Vomiting] : no vomiting [Constipation] : no constipation [Diarrhea] : no diarrhea [Reflux/Heartburn] : no reflux/ heartburn [Hernia] : no hernia [FreeTextEntry2] : weight stable since last visit.  fatigue related to day time sleepiness/ ARCHANA

## 2020-10-13 ENCOUNTER — APPOINTMENT (OUTPATIENT)
Dept: CARDIOLOGY | Facility: CLINIC | Age: 60
End: 2020-10-13
Payer: COMMERCIAL

## 2020-10-13 ENCOUNTER — NON-APPOINTMENT (OUTPATIENT)
Age: 60
End: 2020-10-13

## 2020-10-13 VITALS
BODY MASS INDEX: 42.99 KG/M2 | OXYGEN SATURATION: 100 % | HEART RATE: 85 BPM | SYSTOLIC BLOOD PRESSURE: 126 MMHG | HEIGHT: 65 IN | WEIGHT: 258 LBS | RESPIRATION RATE: 14 BRPM | DIASTOLIC BLOOD PRESSURE: 82 MMHG

## 2020-10-13 VITALS
HEART RATE: 85 BPM | SYSTOLIC BLOOD PRESSURE: 126 MMHG | HEIGHT: 65 IN | OXYGEN SATURATION: 100 % | BODY MASS INDEX: 42.99 KG/M2 | DIASTOLIC BLOOD PRESSURE: 82 MMHG | WEIGHT: 258 LBS

## 2020-10-13 DIAGNOSIS — Z01.810 ENCOUNTER FOR PREPROCEDURAL CARDIOVASCULAR EXAMINATION: ICD-10-CM

## 2020-10-13 PROCEDURE — 93000 ELECTROCARDIOGRAM COMPLETE: CPT | Mod: NC

## 2020-10-13 PROCEDURE — 99204 OFFICE O/P NEW MOD 45 MIN: CPT

## 2020-10-15 ENCOUNTER — APPOINTMENT (OUTPATIENT)
Dept: BARIATRICS/WEIGHT MGMT | Facility: CLINIC | Age: 60
End: 2020-10-15
Payer: COMMERCIAL

## 2020-10-15 DIAGNOSIS — Z78.9 OTHER SPECIFIED HEALTH STATUS: ICD-10-CM

## 2020-10-15 PROCEDURE — 90791 PSYCH DIAGNOSTIC EVALUATION: CPT | Mod: 95

## 2020-10-26 ENCOUNTER — APPOINTMENT (OUTPATIENT)
Dept: GASTROENTEROLOGY | Facility: CLINIC | Age: 60
End: 2020-10-26
Payer: COMMERCIAL

## 2020-10-26 VITALS
DIASTOLIC BLOOD PRESSURE: 82 MMHG | HEIGHT: 65 IN | OXYGEN SATURATION: 100 % | BODY MASS INDEX: 42.99 KG/M2 | SYSTOLIC BLOOD PRESSURE: 133 MMHG | WEIGHT: 258 LBS | HEART RATE: 86 BPM

## 2020-10-26 PROCEDURE — 99204 OFFICE O/P NEW MOD 45 MIN: CPT | Mod: 25

## 2020-10-26 PROCEDURE — 99072 ADDL SUPL MATRL&STAF TM PHE: CPT

## 2020-10-26 NOTE — HISTORY OF PRESENT ILLNESS
[FreeTextEntry1] : Yaima Chairez is a very pleasant 60-year-old woman with a history of morbid obesity, adjustable gastric band (deflated about 5 years ago), hypertension, hyperlipidemia, obstructive sleep apnea, and anxiety who presents for evaluation prior to bariatric surgery - she plans to have her adjustable gastric band removed and undergo a laparoscopic sleeve gastrectomy.  She reports having had severe reflux symptoms after her lap band and she requested that it is removed for that reason. the band was deflated and she has since has tolerable symptoms. denies melena. She had a colonoscopy within the last 10 years which was unremarkable. she is from [facility] and her , an OB/Gyn, discussed H Pylori testing with her and she was referred to me for pre-operative EGD with H Pylori testing.

## 2020-10-26 NOTE — PHYSICAL EXAM
[Well Groomed] : well groomed [General Appearance - In No Acute Distress] : no acute distress [Eyelids - No Xanthelasma] : the eyelids demonstrated no xanthelasmas [Heart Rate And Rhythm] : heart rate and rhythm were normal [Heart Sounds] : normal S1 and S2 [Murmurs] : no murmurs present [Respiration, Rhythm And Depth] : normal respiratory rhythm and effort [Auscultation Breath Sounds / Voice Sounds] : lungs were clear to auscultation bilaterally [Bowel Sounds] : normal bowel sounds [Gait - Sufficient For Exercise Testing] : the gait was sufficient for exercise testing [Nail Clubbing] : no clubbing of the fingernails [Cyanosis, Localized] : no localized cyanosis [] : no rash [Oriented To Time, Place, And Person] : oriented to person, place, and time [Impaired Insight] : insight and judgment were intact [Affect] : the affect was normal [Mood] : the mood was normal [FreeTextEntry1] : Obese

## 2020-10-26 NOTE — REASON FOR VISIT
[Initial Evaluation] : an initial evaluation [FreeTextEntry1] : pre-op evaluation for bariatric surgery, chronic GERD

## 2020-10-26 NOTE — ASSESSMENT
[FreeTextEntry1] : Yaima is a very pleasant 60 year old female presenting for evaluation of long standing GERD in setting of morbid obesity and need for bariatric surgery. She previously has a lot of dyspepsia and reflux following lap band requiring emptying. She is now being evaluated for gastric sleeve. I discussed with her the long term improvement in reflux and upper GI symptoms with weight loss, the importance of NSAID avoidance and the need for pre-surgical EGD to evaluate above. \par \par I explained to the patient the risks, alternatives and benefits to an EGD. Risk including but not limited to bleeding, perforation, infection adverse medication reaction. Questions were answered. agreeable to proceed with the planned procedures. \par \par

## 2020-10-26 NOTE — DISCUSSION/SUMMARY
[FreeTextEntry1] : \par Preoperative cardiac examination: Mrs. Chairez has no past history of heart disease and describes a good baseline functional status with no symptoms suggestive of cardiac disease.  She'll return for a preoperative cardiac testing, including echocardiography and exercise ECG stress test.  She describes a recent diagnosis of obstructive sleep apnea -- echocardiogram will help estimate pulmonary artery pressure.\par \par Hyperlipidemia: Mild increase in LDL with every other day dosing of rosuvastatin - as a result, she recently resumed daily dosing.\par \par Hypertension: Satisfactory control

## 2020-10-26 NOTE — REVIEW OF SYSTEMS
[Recent Weight Gain (___ Lbs)] : recent [unfilled] ~Ulb weight gain [Negative] : Heme/Lymph [Shortness Of Breath] : no shortness of breath [Dyspnea on exertion] : not dyspnea during exertion [Chest Pain] : no chest pain [Palpitations] : no palpitations

## 2020-10-29 ENCOUNTER — NON-APPOINTMENT (OUTPATIENT)
Age: 60
End: 2020-10-29

## 2020-11-05 ENCOUNTER — APPOINTMENT (OUTPATIENT)
Dept: BARIATRICS | Facility: CLINIC | Age: 60
End: 2020-11-05
Payer: COMMERCIAL

## 2020-11-05 VITALS — HEIGHT: 65 IN | WEIGHT: 255 LBS | BODY MASS INDEX: 42.49 KG/M2

## 2020-11-05 PROCEDURE — 99214 OFFICE O/P EST MOD 30 MIN: CPT | Mod: 95

## 2020-11-10 NOTE — ASSESSMENT
[FreeTextEntry1] : 60 year old F undergoing workup for a single stage conversion - removal of lap band and port converted to laparoscopic sleeve gastrectomy here for a TELEMEDICINE WEIGH IN VISIT.Currently lap band is empty. Weight loss  since last visit. Current weight - 255 lbs.  Patient is currently in the process of completing her workup as well as a medically supervised diet\par \par Pt has recently seen pulmonologist and is planning to schedule a home sleep study to be evaluated for Obstructive Sleep Apnea. \par \par Nutrition one on one follow up scheduled on 11/17/2020.   Psych seen on -10/15/2020 \par \par Pt continues to work on  following a 3 meal a day regimen- consuming a protein shake occasionally as a meal replacement if needed/ decreasing daily fruit intake- no more than 2 servings of fruit per day/ lean protein. Discussed with patient avoiding processed foods/saturated fats/ refined carbohydrates and importance of front loading calories. \par \par 2 additional weigh in visits prior to surgery- December, January 2021. Patient is aware she needs to lose weight prior to surgery. \par GI consult- October 2020 /Upper EGD scheduled on 11/18/2020. \par Needs letter of support/ diet history \par Appointments  and testing with cardiology - EST - 11/20/2020  Echocardiogram- 11/23/2020. \par \par Follow up telemedicine visit scheduled in 4 weeks - weight in visit .

## 2020-11-10 NOTE — HISTORY OF PRESENT ILLNESS
[Procedure: ___] : Procedure performed: [unfilled]  [Date of Surgery: ___] : Date of Surgery:   [unfilled] [Surgeon Name:   ___] : Surgeon Name: Dr. NESS [Pre-Op Weight ___] : Pre-op weight was [unfilled] lbs [Home] : at home, [unfilled] , at the time of the visit. [Medical Office: ([facility])___] : at the medical office located in  [Verbal consent obtained from patient] : the patient, [unfilled] [de-identified] : 60 year old F undergoing workup for a single stage conversion - removal of lap band and port converted to laparoscopic sleeve gastrectomy here for a TELEMEDICINE WEIGH IN VISIT.Currently lap band is empty. Weight loss  since last visit. Current weight - 255 lbs.  Patient is currently in the process of completing her workup as well as a medically supervised diet.Pt states since last visit she has not been napping at night and  - eating dinner earlier in evening ( between 6-7 pm)/ avoiding snacking between meals/ not skipping breakfast. Patient believes she is making better food choices since last visit and  is consuming  ~ 1-2  servings of fruit per day. Pt believes she is consuming a sufficient amount of zero calorie liquid. Patient knows she needs to lose weight prior to surgery. Plan to increase daily exercise incorporating cardio- walking and is planning to  increase daily  strength training. All questions were answered. Discussed and reviewed further requirements needed prior to scheduling surgery. Pt has recently seen pulmonologist and is planning to schedule a home sleep study to be evaluated for Obstructive Sleep Apnea. \par

## 2020-11-10 NOTE — REVIEW OF SYSTEMS
[Fatigue] : fatigue [Negative] : Heme/Lymph [Recent Change In Weight] : ~T no recent weight change [Dysphagia] : no dysphagia [Chest Pain] : no chest pain [Shortness Of Breath] : no shortness of breath [Wheezing] : no wheezing [Cough] : no cough [SOB on Exertion] : no shortness of breath during exertion [Abdominal Pain] : no abdominal pain [Vomiting] : no vomiting [Constipation] : no constipation [Diarrhea] : no diarrhea [Reflux/Heartburn] : no reflux/ heartburn [Hernia] : no hernia [FreeTextEntry2] : weight loss since last visit.

## 2020-11-15 ENCOUNTER — APPOINTMENT (OUTPATIENT)
Dept: DISASTER EMERGENCY | Facility: CLINIC | Age: 60
End: 2020-11-15

## 2020-11-15 ENCOUNTER — LABORATORY RESULT (OUTPATIENT)
Age: 60
End: 2020-11-15

## 2020-11-17 ENCOUNTER — APPOINTMENT (OUTPATIENT)
Dept: BARIATRICS/WEIGHT MGMT | Facility: CLINIC | Age: 60
End: 2020-11-17

## 2020-11-18 ENCOUNTER — APPOINTMENT (OUTPATIENT)
Dept: GASTROENTEROLOGY | Facility: AMBULATORY MEDICAL SERVICES | Age: 60
End: 2020-11-18
Payer: COMMERCIAL

## 2020-11-18 ENCOUNTER — RESULT REVIEW (OUTPATIENT)
Age: 60
End: 2020-11-18

## 2020-11-18 PROCEDURE — 43239 EGD BIOPSY SINGLE/MULTIPLE: CPT

## 2020-11-23 ENCOUNTER — APPOINTMENT (OUTPATIENT)
Dept: CARDIOLOGY | Facility: CLINIC | Age: 60
End: 2020-11-23
Payer: COMMERCIAL

## 2020-11-23 PROCEDURE — 93015 CV STRESS TEST SUPVJ I&R: CPT

## 2020-11-26 ENCOUNTER — NON-APPOINTMENT (OUTPATIENT)
Age: 60
End: 2020-11-26

## 2020-12-03 ENCOUNTER — APPOINTMENT (OUTPATIENT)
Dept: BARIATRICS | Facility: CLINIC | Age: 60
End: 2020-12-03
Payer: COMMERCIAL

## 2020-12-03 VITALS — WEIGHT: 258 LBS | BODY MASS INDEX: 42.99 KG/M2 | HEIGHT: 65 IN

## 2020-12-03 PROCEDURE — 99214 OFFICE O/P EST MOD 30 MIN: CPT | Mod: 95

## 2020-12-03 RX ORDER — LORAZEPAM 1 MG/1
1 TABLET ORAL
Qty: 30 | Refills: 0 | Status: DISCONTINUED | COMMUNITY
Start: 2018-10-08 | End: 2020-12-03

## 2020-12-03 NOTE — REASON FOR VISIT
[Home] : at home, [unfilled] , at the time of the visit. [Medical Office: ([facility])___] : at the medical office located in  [Verbal consent obtained from patient] : the patient, [unfilled] [Follow-Up Visit] : a follow-up visit for [Morbid Obesity (BMI>40)] : morbid obesity (bmi>40)

## 2020-12-04 ENCOUNTER — TRANSCRIPTION ENCOUNTER (OUTPATIENT)
Age: 60
End: 2020-12-04

## 2020-12-07 ENCOUNTER — NON-APPOINTMENT (OUTPATIENT)
Age: 60
End: 2020-12-07

## 2020-12-07 ENCOUNTER — APPOINTMENT (OUTPATIENT)
Dept: INTERNAL MEDICINE | Facility: CLINIC | Age: 60
End: 2020-12-07
Payer: COMMERCIAL

## 2020-12-07 ENCOUNTER — TRANSCRIPTION ENCOUNTER (OUTPATIENT)
Age: 60
End: 2020-12-07

## 2020-12-07 PROCEDURE — 99213 OFFICE O/P EST LOW 20 MIN: CPT | Mod: 95

## 2020-12-07 NOTE — HISTORY OF PRESENT ILLNESS
[FreeTextEntry1] : LILI [Home] : at home, [unfilled] , at the time of the visit. [Medical Office: ([facility])___] : at the medical office located in  [Verbal consent obtained from patient] : the patient, [unfilled] [de-identified] : Pt has ulcer on endoscopy. Denies NSAID's. She thinks caused by stress. Doesn't drink or smoke. H pylori = negative. \par \par Needs letter of support for gastric sleeve.

## 2020-12-08 ENCOUNTER — TRANSCRIPTION ENCOUNTER (OUTPATIENT)
Age: 60
End: 2020-12-08

## 2020-12-10 NOTE — ASSESSMENT
[FreeTextEntry1] : 60 year old F undergoing workup for a single stage conversion - removal of lap band and port converted to laparoscopic sleeve gastrectomy here for a TELEMEDICINE WEIGH IN VISIT.Currently lap band is empty. Weight loss  since last visit. Current weight - 258 lbs.  Patient is currently in the process of completing her workup as well as a medically supervised diet\par \par Pt has recently seen pulmonologist and is planning to schedule a home sleep study to be evaluated for Obstructive Sleep Apnea. \par \par Nutrition one on one follow up scheduled on 11/17/2020.  Psych seen on -10/15/2020 Plan to follow up with Nutritionist in January 2021.\par \par Pt continues to work on  following a 3 meal a day regimen- consuming a protein shake occasionally as a meal replacement if needed/ decreasing daily fruit intake- no more than 2 servings of fruit per day/ lean protein. Discussed with patient avoiding processed foods/saturated fats/ refined carbohydrates and importance of front loading calories. \par \par 1 additional weigh in visits prior to surgery- January 2021. Patient is aware she needs to lose weight prior to surgery. \par GI consult seen in October 2020 /Upper EGD performed  on 11/18/2020.  - + gastric ulcer - antrum- erosion, congestion, erythema - antrum ( erosive Gastritis). H pylori negative on pathology report. Pt was advised to repeat Upper EGD in 3 month .\par \par Needs letter of support/ diet history \par \par Appointments  and testing with cardiology - EST - 11/20/2020  Echocardiogram- 11/23/2020. \par \par Follow up telemedicine visit scheduled in 4 weeks - weight in visit .

## 2020-12-10 NOTE — HISTORY OF PRESENT ILLNESS
[Home] : at home, [unfilled] , at the time of the visit. [Medical Office: ([facility])___] : at the medical office located in  [Verbal consent obtained from patient] : the patient, [unfilled] [Procedure: ___] : Procedure performed: [unfilled]  [Date of Surgery: ___] : Date of Surgery:   [unfilled] [Surgeon Name:   ___] : Surgeon Name: Dr. NESS [Pre-Op Weight ___] : Pre-op weight was [unfilled] lbs [de-identified] : 60 year old F undergoing workup for a single stage conversion - removal of lap band and port converted to laparoscopic sleeve gastrectomy here for a TELEMEDICINE WEIGH IN VISIT.Currently lap band is empty. Weight loss  since last visit. Current weight - 258 lbs. Patient is currently in the process of completing her workup as well as a medically supervised diet.Pt indicated that she is no longer napping at night   - eating dinner earlier in evening ( between 6-7 pm)/ avoiding snacking between meals/ not skipping breakfast. Patient believes she is making better food choices since last visit and  is consuming  ~ 1-2  servings of fruit per day. Pt believes she is consuming a sufficient amount of zero calorie liquid. Patient knows she needs to lose weight prior to surgery. Plan to increase daily exercise incorporating cardio- walking and is planning to  increase daily  strength training. All questions were answered. Discussed and reviewed further requirements needed prior to scheduling surgery. Pt has recently seen pulmonologist and is planning to schedule a home sleep study to be evaluated for Obstructive Sleep Apnea. \par

## 2020-12-16 ENCOUNTER — RX RENEWAL (OUTPATIENT)
Age: 60
End: 2020-12-16

## 2020-12-22 ENCOUNTER — RX RENEWAL (OUTPATIENT)
Age: 60
End: 2020-12-22

## 2020-12-27 ENCOUNTER — APPOINTMENT (OUTPATIENT)
Dept: DISASTER EMERGENCY | Facility: CLINIC | Age: 60
End: 2020-12-27

## 2020-12-28 LAB — SARS-COV-2 N GENE NPH QL NAA+PROBE: NOT DETECTED

## 2020-12-30 ENCOUNTER — APPOINTMENT (OUTPATIENT)
Dept: GASTROENTEROLOGY | Facility: AMBULATORY MEDICAL SERVICES | Age: 60
End: 2020-12-30
Payer: COMMERCIAL

## 2020-12-30 ENCOUNTER — RESULT REVIEW (OUTPATIENT)
Age: 60
End: 2020-12-30

## 2020-12-30 PROCEDURE — 43239 EGD BIOPSY SINGLE/MULTIPLE: CPT

## 2021-01-04 ENCOUNTER — TRANSCRIPTION ENCOUNTER (OUTPATIENT)
Age: 61
End: 2021-01-04

## 2021-01-04 ENCOUNTER — APPOINTMENT (OUTPATIENT)
Dept: CARDIOLOGY | Facility: CLINIC | Age: 61
End: 2021-01-04
Payer: COMMERCIAL

## 2021-01-04 PROCEDURE — 93306 TTE W/DOPPLER COMPLETE: CPT

## 2021-01-05 ENCOUNTER — APPOINTMENT (OUTPATIENT)
Dept: BARIATRICS | Facility: CLINIC | Age: 61
End: 2021-01-05
Payer: COMMERCIAL

## 2021-01-05 VITALS — WEIGHT: 255 LBS | BODY MASS INDEX: 42.49 KG/M2 | HEIGHT: 65 IN

## 2021-01-05 PROCEDURE — 99214 OFFICE O/P EST MOD 30 MIN: CPT | Mod: 95

## 2021-01-05 NOTE — REASON FOR VISIT
[Follow-Up Visit] : a follow-up visit for [Morbid Obesity (BMI>40)] : morbid obesity (bmi>40) [Home] : at home, [unfilled] , at the time of the visit. [Medical Office: ([facility])___] : at the medical office located in  [Verbal consent obtained from patient] : the patient, [unfilled]

## 2021-01-06 ENCOUNTER — TRANSCRIPTION ENCOUNTER (OUTPATIENT)
Age: 61
End: 2021-01-06

## 2021-01-06 NOTE — REVIEW OF SYSTEMS
[Shortness Of Breath] : no shortness of breath [Dyspnea on exertion] : not dyspnea during exertion [Chest Pain] : no chest pain [Palpitations] : no palpitations

## 2021-01-06 NOTE — ADDENDUM
[FreeTextEntry1] : ADDENDUM (1/6/2021):\par \par Ms. Chairez return for cardiac testing.\par \par Echocardiography (January 4, 2021) revealed normal left ventricular size and systolic function with estimated ejection fraction 58%. Mild concentric left ventricular hypertrophy. Mild diastolic dysfunction. Mild tricuspid regurgitation. Normal estimated pulmonary artery pressures.\par \par Exercise ECG stress testing was performed on November 23, 2020. She was only able to complete 2 minutes 25 seconds of the Griffin protocol and achieved 89% of her maximal predicted heart rate and 4 METS.  There were no ischemic ECG changes with exercise; rare premature ventricular complexes were noted.\par \par She appears stable from a cardiac standpoint to proceed with upcoming bariatric surgery.

## 2021-01-06 NOTE — HISTORY OF PRESENT ILLNESS
[FreeTextEntry1] : Yaima Chairez is a 60-year-old woman with a history of morbid obesity, adjustable gastric band (not inflated), hypertension, hyperlipidemia, obstructive sleep apnea, and anxiety who presents for cardiac examination prior to bariatric surgery - she plans to have her adjustable gastric band removed and undergo a laparoscopic sleeve gastrectomy.  She has no past history of heart disease. She does not exercise but describes a good baseline functional status.  She has not been experiencing angina, dyspnea, palpitations, or syncope.  She describes previous surgical procedures (including  section and knee replacement surgery) with no cardiac or anesthetic complications.

## 2021-01-08 NOTE — ASSESSMENT
[FreeTextEntry1] : 60 year old F undergoing workup for a single stage conversion - removal of lap band and port converted to laparoscopic sleeve gastrectomy here for a TELEMEDICINE WEIGH IN VISIT.Currently lap band is empty. Weight loss  since last visit. Current weight - 258 lbs.  Patient is currently in the process of completing her workup as well as a medically supervised diet\par \par Echocardiogram was performed yesterday . Plan to have Stress test forwarded from office. Awaiting results to be forwarded to office. Recent Upper EGD completed on 12/20/2020 - significant improvement since previous study. \par \par Follow up telemedicine visit with Odilia MCCARTNEY scheduled on 1/13/2021. \par \par Pt continues to work on  following a 3 meal a day regimen- consuming a protein shake occasionally as a meal replacement if needed/ decreasing daily fruit intake- no more than 2 servings of fruit per day/ lean protein. Discussed with patient avoiding processed foods/saturated fats/ refined carbohydrates and importance of front loading calories. \par \par \par Plan to schedule a follow up after we receive all requirements needed prior to scheduling surgery.

## 2021-01-08 NOTE — HISTORY OF PRESENT ILLNESS
[Procedure: ___] : Procedure performed: [unfilled]  [Date of Surgery: ___] : Date of Surgery:   [unfilled] [Surgeon Name:   ___] : Surgeon Name: Dr. NESS [Pre-Op Weight ___] : Pre-op weight was [unfilled] lbs [Home] : at home, [unfilled] , at the time of the visit. [Medical Office: ([facility])___] : at the medical office located in  [Verbal consent obtained from patient] : the patient, [unfilled] [de-identified] : 60 year old F undergoing workup for a single stage conversion - removal of lap band and port converted to laparoscopic sleeve gastrectomy here for a TELEMEDICINE WEIGH IN VISIT.Currently lap band is empty. Weight loss  since last visit. Current weight - 255 lbs. Patient is currently in the process of completing her workup as well as a medically supervised diet.Pt indicated that she is no longer napping at night   - eating dinner earlier in evening ( between 6-7 pm)/ avoiding snacking between meals/ not skipping breakfast.Pt is continues  to work on portion control with  all 3 meals per day.  Patient believes she is making better food choices since last visit and  is consuming  ~ 1-2  servings of fruit per day. Pt believes she is consuming a sufficient amount of zero calorie liquid. Patient knows she needs to lose weight prior to surgery. Plan to increase daily exercise incorporating cardio- walking and is planning to  increase daily  strength training. All questions were answered. Discussed and reviewed further requirements needed prior to scheduling surgery. Pt recently had Sleep Study - negative for ARHCANA - pt cleared by pulmonologist. Echocardiogram was performed yesterday . Plan to have Stress test forwarded from office. Awaiting results to be forwarded to office. Recent Upper EGD completed on 12/20/2020 - significant improvement since previous study. Follow up telemedicine visit with Odilia MCCARTNEY scheduled on 1/13/2021. \par

## 2021-01-13 ENCOUNTER — NON-APPOINTMENT (OUTPATIENT)
Age: 61
End: 2021-01-13

## 2021-01-13 ENCOUNTER — APPOINTMENT (OUTPATIENT)
Dept: BARIATRICS/WEIGHT MGMT | Facility: CLINIC | Age: 61
End: 2021-01-13
Payer: COMMERCIAL

## 2021-01-13 VITALS — HEIGHT: 65 IN | BODY MASS INDEX: 42.99 KG/M2 | WEIGHT: 258 LBS

## 2021-01-13 PROCEDURE — 97803 MED NUTRITION INDIV SUBSEQ: CPT | Mod: 95

## 2021-01-14 ENCOUNTER — RX RENEWAL (OUTPATIENT)
Age: 61
End: 2021-01-14

## 2021-01-15 ENCOUNTER — TRANSCRIPTION ENCOUNTER (OUTPATIENT)
Age: 61
End: 2021-01-15

## 2021-01-20 ENCOUNTER — APPOINTMENT (OUTPATIENT)
Dept: BARIATRICS | Facility: CLINIC | Age: 61
End: 2021-01-20
Payer: COMMERCIAL

## 2021-01-20 VITALS
HEIGHT: 65 IN | WEIGHT: 266.31 LBS | HEART RATE: 90 BPM | TEMPERATURE: 97.1 F | BODY MASS INDEX: 44.37 KG/M2 | OXYGEN SATURATION: 98 % | SYSTOLIC BLOOD PRESSURE: 136 MMHG | DIASTOLIC BLOOD PRESSURE: 86 MMHG

## 2021-01-20 DIAGNOSIS — K25.9 GASTRIC ULCER, UNSPECIFIED AS ACUTE OR CHRONIC, W/OUT HEMORRHAGE OR PERFORATION: ICD-10-CM

## 2021-01-20 DIAGNOSIS — Z13.228 ENCOUNTER FOR SCREENING FOR OTHER SUSPECTED ENDOCRINE DISORDER: ICD-10-CM

## 2021-01-20 DIAGNOSIS — Z13.0 ENCOUNTER FOR SCREENING FOR DISEASES OF THE BLOOD AND BLOOD-FORMING ORGANS AND CERTAIN DISORDERS INVOLVING THE IMMUNE MECHANISM: ICD-10-CM

## 2021-01-20 DIAGNOSIS — Z13.29 ENCOUNTER FOR SCREENING FOR OTHER SUSPECTED ENDOCRINE DISORDER: ICD-10-CM

## 2021-01-20 DIAGNOSIS — Z13.0 ENCOUNTER FOR SCREENING FOR OTHER SUSPECTED ENDOCRINE DISORDER: ICD-10-CM

## 2021-01-20 PROCEDURE — 99214 OFFICE O/P EST MOD 30 MIN: CPT

## 2021-01-20 PROCEDURE — 99072 ADDL SUPL MATRL&STAF TM PHE: CPT

## 2021-01-27 VITALS
SYSTOLIC BLOOD PRESSURE: 124 MMHG | HEART RATE: 88 BPM | HEIGHT: 65 IN | OXYGEN SATURATION: 100 % | DIASTOLIC BLOOD PRESSURE: 50 MMHG | WEIGHT: 265 LBS | TEMPERATURE: 98 F | RESPIRATION RATE: 20 BRPM

## 2021-01-27 RX ORDER — SERTRALINE 25 MG/1
1 TABLET, FILM COATED ORAL
Qty: 0 | Refills: 0 | DISCHARGE

## 2021-01-27 NOTE — H&P PST ADULT - HISTORY OF PRESENT ILLNESS
This is 61 y/o female who had undergone lap band placement 2005 with complaint of gastric band malfunction  .she plans to have gastric band removed and undergo for sleeve gastrectomy. scheduled for surgery on 2/10/21

## 2021-01-27 NOTE — H&P PST ADULT - ASSESSMENT
61 y/o female with gastric band malfunction     scheduled for revision of lap band to sleeve gastrectomy with endoscopy on 2/10/21   Will obtain medical clearance   Pre op instructions on medications   COVID testing on 2/8/21 59 y/o female with gastric band malfunction     scheduled for revision of lap band to sleeve gastrectomy with endoscopy on 2/10/21   Will obtain medical clearance   Pre op instructions on medications   COVID testing on 2/8/21  Physical exam done on admit.  SS

## 2021-01-27 NOTE — H&P PST ADULT - NSICDXPASTSURGICALHX_GEN_ALL_CORE_FT
PAST SURGICAL HISTORY:  H/O total knee replacement, left 2015    S/P cataract surgery, left 2016    S/P  section     Status post gastric banding

## 2021-01-27 NOTE — H&P PST ADULT - NSANTHOSAYNRD_GEN_A_CORE
No. ARCHANA screening performed.  STOP BANG Legend: 0-2 = LOW Risk; 3-4 = INTERMEDIATE Risk; 5-8 = HIGH Risk

## 2021-01-27 NOTE — H&P PST ADULT - NSICDXPASTMEDICALHX_GEN_ALL_CORE_FT
PAST MEDICAL HISTORY:  Anxiety     GERD (gastroesophageal reflux disease)     HTN (hypertension)     Hypercholesterolemia     Morbid obesity with BMI of 40.0-44.9, adult

## 2021-01-28 ENCOUNTER — OUTPATIENT (OUTPATIENT)
Dept: OUTPATIENT SERVICES | Facility: HOSPITAL | Age: 61
LOS: 1 days | End: 2021-01-28
Payer: COMMERCIAL

## 2021-01-28 DIAGNOSIS — Z98.89 OTHER SPECIFIED POSTPROCEDURAL STATES: Chronic | ICD-10-CM

## 2021-01-28 DIAGNOSIS — Z96.652 PRESENCE OF LEFT ARTIFICIAL KNEE JOINT: Chronic | ICD-10-CM

## 2021-01-28 DIAGNOSIS — Z01.818 ENCOUNTER FOR OTHER PREPROCEDURAL EXAMINATION: ICD-10-CM

## 2021-01-28 DIAGNOSIS — Z98.42 CATARACT EXTRACTION STATUS, LEFT EYE: Chronic | ICD-10-CM

## 2021-01-28 DIAGNOSIS — E66.01 MORBID (SEVERE) OBESITY DUE TO EXCESS CALORIES: ICD-10-CM

## 2021-01-28 DIAGNOSIS — Z98.84 BARIATRIC SURGERY STATUS: Chronic | ICD-10-CM

## 2021-01-28 DIAGNOSIS — Z98.84 BARIATRIC SURGERY STATUS: ICD-10-CM

## 2021-01-28 PROBLEM — Z13.29 SCREENING FOR OTHER AND UNSPECIFIED ENDOCRINE, NUTRITIONAL, METABOLIC AND IMMUNITY DISORDERS: Status: RESOLVED | Noted: 2020-08-05 | Resolved: 2021-01-28

## 2021-01-28 PROBLEM — K25.9 GASTRIC ULCER: Status: ACTIVE | Noted: 2020-12-03

## 2021-01-28 PROBLEM — Z13.0 SCREENING FOR DISORDER OF BLOOD AND BLOOD-FORMING ORGANS: Status: RESOLVED | Noted: 2020-08-05 | Resolved: 2021-01-28

## 2021-01-28 PROCEDURE — G0463: CPT

## 2021-01-28 RX ORDER — ONDANSETRON 8 MG/1
4 TABLET, FILM COATED ORAL EVERY 6 HOURS
Refills: 0 | Status: DISCONTINUED | OUTPATIENT
Start: 2021-02-10 | End: 2021-02-11

## 2021-01-28 RX ORDER — SODIUM CHLORIDE 9 MG/ML
1000 INJECTION, SOLUTION INTRAVENOUS
Refills: 0 | Status: DISCONTINUED | OUTPATIENT
Start: 2021-02-10 | End: 2021-02-11

## 2021-01-28 RX ORDER — HYOSCYAMINE SULFATE 0.13 MG
0.12 TABLET ORAL EVERY 6 HOURS
Refills: 0 | Status: DISCONTINUED | OUTPATIENT
Start: 2021-02-10 | End: 2021-02-11

## 2021-01-28 RX ORDER — PANTOPRAZOLE SODIUM 20 MG/1
40 TABLET, DELAYED RELEASE ORAL DAILY
Refills: 0 | Status: DISCONTINUED | OUTPATIENT
Start: 2021-02-10 | End: 2021-02-11

## 2021-01-28 RX ORDER — HYDROMORPHONE HYDROCHLORIDE 2 MG/ML
0.5 INJECTION INTRAMUSCULAR; INTRAVENOUS; SUBCUTANEOUS EVERY 4 HOURS
Refills: 0 | Status: DISCONTINUED | OUTPATIENT
Start: 2021-02-10 | End: 2021-02-11

## 2021-01-28 RX ORDER — ENOXAPARIN SODIUM 100 MG/ML
40 INJECTION SUBCUTANEOUS EVERY 12 HOURS
Refills: 0 | Status: DISCONTINUED | OUTPATIENT
Start: 2021-02-10 | End: 2021-02-11

## 2021-02-02 NOTE — ASSESSMENT
[FreeTextEntry1] : 60 year old female with longstanding history of obesity including lap band completed workup for laparoscopic sleeve gastrectomy and is ready to be scheduled for laparoscopic sleeve gastrectomy. Patient was encouraged to lose weight prior to surgery. Patient will be on preoperative modified liquid diet.  Nutrition and exercise guidelines were reviewed with the patient. Patient will attend preoperative education class. Procedure risks and benefits were again discussed with patient.  The additional risks associated with current COVID 19 pandemic was discussed in detail.  Patient was informed that she will get tested for COVID 19 prior to surgery and then need to self quarantine for a period of time before and after surgery. All questions were answered.\par \par Schedule surgery date\par Three week preoperative modified liquid diet\par Preop labs\par Medical clearance with EKG\par Preoperative education class\par Call if any questions or concerns.

## 2021-02-02 NOTE — REVIEW OF SYSTEMS
[Recent Change In Weight] : ~T recent weight change [Negative] : Heme/Lymph [Fatigue] : no fatigue [Dysphagia] : no dysphagia [Chest Pain] : no chest pain [Shortness Of Breath] : no shortness of breath [Wheezing] : no wheezing [Cough] : no cough [SOB on Exertion] : no shortness of breath during exertion [Abdominal Pain] : no abdominal pain [Vomiting] : no vomiting [Constipation] : no constipation [Diarrhea] : no diarrhea [Reflux/Heartburn] : no reflux/ heartburn [Hernia] : no hernia [FreeTextEntry2] : weight gain

## 2021-02-02 NOTE — PHYSICAL EXAM
[Obese, well nourished, in no acute distress] : obese, well nourished, in no acute distress [Normal] : affect appropriate [de-identified] : EOMI, anicteric  [de-identified] : Soft and nontender, no hepatosplenomegaly, masses or hernias appreciated. Lap-band port site without erythema or tenderness.

## 2021-02-02 NOTE — HISTORY OF PRESENT ILLNESS
[Procedure: ___] : Procedure performed: [unfilled]  [Date of Surgery: ___] : Date of Surgery:   [unfilled] [Surgeon Name:   ___] : Surgeon Name: Dr. NESS [Pre-Op Weight ___] : Pre-op weight was [unfilled] lbs [de-identified] : 60 year old female with longstanding history of morbid obesity including lap band undergoing workup for laparoscopic sleeve gastrectomy presents today for preoperative visit. Patient gained weight since last visit, which may be related to difference between home and office scale. She met with nutritionist last week. She is having three protein rich meals a day and drinks mostly water. For exercise, she is walking frequently.  The lap band has been empty since 2017. During workup, upper EGD revealed gastric ulcer and was placed on PPI. Follow up EGD found healing ulcer. Patient denies acid reflux symptoms prior to and after initiation of PPI.

## 2021-02-04 ENCOUNTER — APPOINTMENT (OUTPATIENT)
Dept: INTERNAL MEDICINE | Facility: CLINIC | Age: 61
End: 2021-02-04
Payer: COMMERCIAL

## 2021-02-04 VITALS
WEIGHT: 257 LBS | RESPIRATION RATE: 14 BRPM | HEART RATE: 86 BPM | DIASTOLIC BLOOD PRESSURE: 88 MMHG | SYSTOLIC BLOOD PRESSURE: 126 MMHG | BODY MASS INDEX: 42.82 KG/M2 | TEMPERATURE: 96.8 F | OXYGEN SATURATION: 98 % | HEIGHT: 65 IN

## 2021-02-04 PROBLEM — F41.9 ANXIETY DISORDER, UNSPECIFIED: Chronic | Status: ACTIVE | Noted: 2021-01-27

## 2021-02-04 PROBLEM — K21.9 GASTRO-ESOPHAGEAL REFLUX DISEASE WITHOUT ESOPHAGITIS: Chronic | Status: ACTIVE | Noted: 2021-01-27

## 2021-02-04 PROBLEM — E66.01 MORBID (SEVERE) OBESITY DUE TO EXCESS CALORIES: Chronic | Status: ACTIVE | Noted: 2021-01-27

## 2021-02-04 PROCEDURE — 99072 ADDL SUPL MATRL&STAF TM PHE: CPT

## 2021-02-04 PROCEDURE — 99214 OFFICE O/P EST MOD 30 MIN: CPT

## 2021-02-04 NOTE — HISTORY OF PRESENT ILLNESS
[No Pertinent Cardiac History] : no history of aortic stenosis, atrial fibrillation, coronary artery disease, recent myocardial infarction, or implantable device/pacemaker [No Pertinent Pulmonary History] : no history of asthma, COPD, sleep apnea, or smoking [No Adverse Anesthesia Reaction] : no adverse anesthesia reaction in self or family member [Chronic Anticoagulation] : no chronic anticoagulation [Chronic Kidney Disease] : no chronic kidney disease [Diabetes] : no diabetes [FreeTextEntry1] : gastric sleeve surgery [FreeTextEntry2] : 2/10/2021 [FreeTextEntry3] : Dr Azevedo [FreeTextEntry4] : Pt having gastric sleeve surgery and removal of gastric band.

## 2021-02-04 NOTE — ASSESSMENT
[Patient Optimized for Surgery] : Patient optimized for surgery [FreeTextEntry4] : obesity\par for surgery\par \par anxiety\par continue zoloft and therapy\par \par hyperlipidemia\par on rosuvastatin\par \par GERD\par pantoprazole\par \par reviewed Dr Wooten's note and addendum done 1/6/2021\par \par optimized medically for surgery\par \par

## 2021-02-08 ENCOUNTER — OUTPATIENT (OUTPATIENT)
Dept: OUTPATIENT SERVICES | Facility: HOSPITAL | Age: 61
LOS: 1 days | End: 2021-02-08
Payer: COMMERCIAL

## 2021-02-08 DIAGNOSIS — Z98.89 OTHER SPECIFIED POSTPROCEDURAL STATES: Chronic | ICD-10-CM

## 2021-02-08 DIAGNOSIS — Z20.828 CONTACT WITH AND (SUSPECTED) EXPOSURE TO OTHER VIRAL COMMUNICABLE DISEASES: ICD-10-CM

## 2021-02-08 DIAGNOSIS — Z98.84 BARIATRIC SURGERY STATUS: Chronic | ICD-10-CM

## 2021-02-08 DIAGNOSIS — Z96.652 PRESENCE OF LEFT ARTIFICIAL KNEE JOINT: Chronic | ICD-10-CM

## 2021-02-08 DIAGNOSIS — Z98.42 CATARACT EXTRACTION STATUS, LEFT EYE: Chronic | ICD-10-CM

## 2021-02-08 LAB — SARS-COV-2 RNA SPEC QL NAA+PROBE: SIGNIFICANT CHANGE UP

## 2021-02-08 PROCEDURE — U0003: CPT

## 2021-02-08 PROCEDURE — U0005: CPT

## 2021-02-09 ENCOUNTER — TRANSCRIPTION ENCOUNTER (OUTPATIENT)
Age: 61
End: 2021-02-09

## 2021-02-09 LAB
ALBUMIN SERPL ELPH-MCNC: 4.1 G/DL
ALP BLD-CCNC: 85 U/L
ALT SERPL-CCNC: 22 U/L
ANION GAP SERPL CALC-SCNC: 13 MMOL/L
AST SERPL-CCNC: 21 U/L
BASOPHILS # BLD AUTO: 0.03 K/UL
BASOPHILS NFR BLD AUTO: 0.5 %
BILIRUB SERPL-MCNC: 0.2 MG/DL
BUN SERPL-MCNC: 12 MG/DL
CALCIUM SERPL-MCNC: 10.1 MG/DL
CHLORIDE SERPL-SCNC: 100 MMOL/L
CO2 SERPL-SCNC: 29 MMOL/L
CREAT SERPL-MCNC: 1.11 MG/DL
EOSINOPHIL # BLD AUTO: 0.11 K/UL
EOSINOPHIL NFR BLD AUTO: 2 %
ESTIMATED AVERAGE GLUCOSE: 117 MG/DL
GLUCOSE SERPL-MCNC: 100 MG/DL
HBA1C MFR BLD HPLC: 5.7 %
HCT VFR BLD CALC: 36.3 %
HGB BLD-MCNC: 11.2 G/DL
IMM GRANULOCYTES NFR BLD AUTO: 0.2 %
LYMPHOCYTES # BLD AUTO: 2.15 K/UL
LYMPHOCYTES NFR BLD AUTO: 38.2 %
MAN DIFF?: NORMAL
MCHC RBC-ENTMCNC: 26.6 PG
MCHC RBC-ENTMCNC: 30.9 GM/DL
MCV RBC AUTO: 86.2 FL
MONOCYTES # BLD AUTO: 0.5 K/UL
MONOCYTES NFR BLD AUTO: 8.9 %
NEUTROPHILS # BLD AUTO: 2.83 K/UL
NEUTROPHILS NFR BLD AUTO: 50.2 %
PLATELET # BLD AUTO: 295 K/UL
POTASSIUM SERPL-SCNC: 4.2 MMOL/L
PROT SERPL-MCNC: 7 G/DL
RBC # BLD: 4.21 M/UL
RBC # FLD: 15.1 %
SARS-COV-2 IGG SERPL IA-ACNC: 0.07 INDEX
SARS-COV-2 IGG SERPL QL IA: NEGATIVE
SODIUM SERPL-SCNC: 142 MMOL/L
WBC # FLD AUTO: 5.63 K/UL

## 2021-02-10 ENCOUNTER — RESULT REVIEW (OUTPATIENT)
Age: 61
End: 2021-02-10

## 2021-02-10 ENCOUNTER — INPATIENT (INPATIENT)
Facility: HOSPITAL | Age: 61
LOS: 0 days | Discharge: HOME | DRG: 327 | End: 2021-02-11
Attending: SURGERY | Admitting: SURGERY
Payer: COMMERCIAL

## 2021-02-10 ENCOUNTER — APPOINTMENT (OUTPATIENT)
Dept: BARIATRICS | Facility: HOSPITAL | Age: 61
End: 2021-02-10
Payer: COMMERCIAL

## 2021-02-10 VITALS
HEIGHT: 65 IN | RESPIRATION RATE: 20 BRPM | WEIGHT: 255.74 LBS | TEMPERATURE: 98 F | SYSTOLIC BLOOD PRESSURE: 123 MMHG | OXYGEN SATURATION: 100 % | DIASTOLIC BLOOD PRESSURE: 51 MMHG | HEART RATE: 88 BPM

## 2021-02-10 DIAGNOSIS — Z98.42 CATARACT EXTRACTION STATUS, LEFT EYE: Chronic | ICD-10-CM

## 2021-02-10 DIAGNOSIS — Z96.652 PRESENCE OF LEFT ARTIFICIAL KNEE JOINT: Chronic | ICD-10-CM

## 2021-02-10 DIAGNOSIS — Z98.84 BARIATRIC SURGERY STATUS: Chronic | ICD-10-CM

## 2021-02-10 DIAGNOSIS — Z98.89 OTHER SPECIFIED POSTPROCEDURAL STATES: Chronic | ICD-10-CM

## 2021-02-10 DIAGNOSIS — E66.01 MORBID (SEVERE) OBESITY DUE TO EXCESS CALORIES: ICD-10-CM

## 2021-02-10 DIAGNOSIS — Z98.84 BARIATRIC SURGERY STATUS: ICD-10-CM

## 2021-02-10 LAB — BLD GP AB SCN SERPL QL: SIGNIFICANT CHANGE UP

## 2021-02-10 PROCEDURE — 43774 LAP RMVL GASTR ADJ ALL PARTS: CPT

## 2021-02-10 PROCEDURE — 43281 LAP PARAESOPHAG HERN REPAIR: CPT | Mod: AS,59

## 2021-02-10 PROCEDURE — 88300 SURGICAL PATH GROSS: CPT | Mod: 26

## 2021-02-10 PROCEDURE — 99223 1ST HOSP IP/OBS HIGH 75: CPT

## 2021-02-10 PROCEDURE — 43281 LAP PARAESOPHAG HERN REPAIR: CPT | Mod: 59

## 2021-02-10 PROCEDURE — 43774 LAP RMVL GASTR ADJ ALL PARTS: CPT | Mod: AS

## 2021-02-10 RX ORDER — ACETAMINOPHEN 500 MG
1000 TABLET ORAL EVERY 6 HOURS
Refills: 0 | Status: COMPLETED | OUTPATIENT
Start: 2021-02-10 | End: 2021-02-11

## 2021-02-10 RX ORDER — ENOXAPARIN SODIUM 100 MG/ML
40 INJECTION SUBCUTANEOUS ONCE
Refills: 0 | Status: COMPLETED | OUTPATIENT
Start: 2021-02-10 | End: 2021-02-10

## 2021-02-10 RX ORDER — CEFAZOLIN SODIUM 1 G
2000 VIAL (EA) INJECTION ONCE
Refills: 0 | Status: COMPLETED | OUTPATIENT
Start: 2021-02-10 | End: 2021-02-10

## 2021-02-10 RX ORDER — IBUPROFEN 200 MG
800 TABLET ORAL EVERY 6 HOURS
Refills: 0 | Status: DISCONTINUED | OUTPATIENT
Start: 2021-02-10 | End: 2021-02-11

## 2021-02-10 RX ORDER — ACETAMINOPHEN 500 MG
1000 TABLET ORAL ONCE
Refills: 0 | Status: COMPLETED | OUTPATIENT
Start: 2021-02-10 | End: 2021-02-10

## 2021-02-10 RX ORDER — APREPITANT 80 MG/1
40 CAPSULE ORAL ONCE
Refills: 0 | Status: COMPLETED | OUTPATIENT
Start: 2021-02-10 | End: 2021-02-10

## 2021-02-10 RX ORDER — ONDANSETRON 8 MG/1
4 TABLET, FILM COATED ORAL ONCE
Refills: 0 | Status: DISCONTINUED | OUTPATIENT
Start: 2021-02-10 | End: 2021-02-10

## 2021-02-10 RX ORDER — HYDROMORPHONE HYDROCHLORIDE 2 MG/ML
0.5 INJECTION INTRAMUSCULAR; INTRAVENOUS; SUBCUTANEOUS
Refills: 0 | Status: DISCONTINUED | OUTPATIENT
Start: 2021-02-10 | End: 2021-02-10

## 2021-02-10 RX ORDER — ACETAMINOPHEN 500 MG
1000 TABLET ORAL EVERY 6 HOURS
Refills: 0 | Status: DISCONTINUED | OUTPATIENT
Start: 2021-02-11 | End: 2021-02-11

## 2021-02-10 RX ORDER — CHLORHEXIDINE GLUCONATE 213 G/1000ML
1 SOLUTION TOPICAL ONCE
Refills: 0 | Status: COMPLETED | OUTPATIENT
Start: 2021-02-10 | End: 2021-02-10

## 2021-02-10 RX ORDER — SODIUM CHLORIDE 9 MG/ML
2000 INJECTION, SOLUTION INTRAVENOUS
Refills: 0 | Status: DISCONTINUED | OUTPATIENT
Start: 2021-02-10 | End: 2021-02-10

## 2021-02-10 RX ORDER — SODIUM CHLORIDE 9 MG/ML
1000 INJECTION, SOLUTION INTRAVENOUS
Refills: 0 | Status: DISCONTINUED | OUTPATIENT
Start: 2021-02-10 | End: 2021-02-10

## 2021-02-10 RX ADMIN — SODIUM CHLORIDE 1000 MILLILITER(S): 9 INJECTION, SOLUTION INTRAVENOUS at 07:11

## 2021-02-10 RX ADMIN — Medication 516 MILLIGRAM(S): at 14:52

## 2021-02-10 RX ADMIN — HYDROMORPHONE HYDROCHLORIDE 0.5 MILLIGRAM(S): 2 INJECTION INTRAMUSCULAR; INTRAVENOUS; SUBCUTANEOUS at 14:40

## 2021-02-10 RX ADMIN — ENOXAPARIN SODIUM 40 MILLIGRAM(S): 100 INJECTION SUBCUTANEOUS at 18:25

## 2021-02-10 RX ADMIN — HYDROMORPHONE HYDROCHLORIDE 0.5 MILLIGRAM(S): 2 INJECTION INTRAMUSCULAR; INTRAVENOUS; SUBCUTANEOUS at 23:15

## 2021-02-10 RX ADMIN — CHLORHEXIDINE GLUCONATE 1 APPLICATION(S): 213 SOLUTION TOPICAL at 07:11

## 2021-02-10 RX ADMIN — SODIUM CHLORIDE 150 MILLILITER(S): 9 INJECTION, SOLUTION INTRAVENOUS at 20:26

## 2021-02-10 RX ADMIN — SODIUM CHLORIDE 1000 MILLILITER(S): 9 INJECTION, SOLUTION INTRAVENOUS at 07:40

## 2021-02-10 RX ADMIN — SODIUM CHLORIDE 150 MILLILITER(S): 9 INJECTION, SOLUTION INTRAVENOUS at 17:50

## 2021-02-10 RX ADMIN — ONDANSETRON 4 MILLIGRAM(S): 8 TABLET, FILM COATED ORAL at 23:23

## 2021-02-10 RX ADMIN — ENOXAPARIN SODIUM 40 MILLIGRAM(S): 100 INJECTION SUBCUTANEOUS at 07:11

## 2021-02-10 RX ADMIN — APREPITANT 40 MILLIGRAM(S): 80 CAPSULE ORAL at 07:11

## 2021-02-10 RX ADMIN — HYDROMORPHONE HYDROCHLORIDE 0.5 MILLIGRAM(S): 2 INJECTION INTRAMUSCULAR; INTRAVENOUS; SUBCUTANEOUS at 18:31

## 2021-02-10 RX ADMIN — Medication 400 MILLIGRAM(S): at 22:59

## 2021-02-10 RX ADMIN — Medication 400 MILLIGRAM(S): at 17:50

## 2021-02-10 RX ADMIN — ONDANSETRON 4 MILLIGRAM(S): 8 TABLET, FILM COATED ORAL at 18:24

## 2021-02-10 NOTE — ASU DISCHARGE PLAN (ADULT/PEDIATRIC) - CARE PROVIDER_API CALL
Bebe Azevedo (MD)  Surgery  [address] [place] [address]  Phone: [phone]  Fax: [phone]  Follow Up Time:

## 2021-02-10 NOTE — BRIEF OPERATIVE NOTE - NSICDXBRIEFPROCEDURE_GEN_ALL_CORE_FT
PROCEDURES:  Laparoscopic lysis of abdominal adhesions 10-Feb-2021 12:06:09  Diana Lock  Laparoscopic removal of gastric band and port 10-Feb-2021 12:01:15 with hiatal hernia repair Diana Lock

## 2021-02-10 NOTE — ASU DISCHARGE PLAN (ADULT/PEDIATRIC) - SPECIFY DIET AND FLUID
Clear liquids for 24 hours. If tolerated, advance to soft foods.  If able to tolerate soft foods then you may progress to small portions of regular food. If unable to tolerate the next level of food progression, remain on the present level for another 24 hours, then slowly advance again. Notify MD if you are unable to progress to soft foods.

## 2021-02-10 NOTE — CONSULT NOTE ADULT - SUBJECTIVE AND OBJECTIVE BOX
Information Obtained from:  EHR, Physical Chart, Patient at bedside (relevant EHR and Chart information verified with patient)    HPI:  59yo F who had lap band  with subsequent malfunction, presented today for gastric band removal and sleeve gastrectomy.  Hx of obesity with BMI 42.     REVIEW OF SYSTEMS:  CONSTITUTIONAL: No fever, weight loss, or fatigue  EYES: No eye pain, visual disturbances, or discharge  ENMT:  No difficulty hearing, tinnitus, vertigo; No sinus or throat pain  NECK: No pain or stiffness  BREASTS: No pain, masses, or nipple discharge  RESPIRATORY: No cough, wheezing, chills or hemoptysis; No shortness of breath  CARDIOVASCULAR: No chest pain, palpitations, dizziness, or leg swelling  GASTROINTESTINAL: No abdominal or epigastric pain. No nausea, vomiting, or hematemesis; No diarrhea or constipation. No melena or hematochezia.  GENITOURINARY: No dysuria, frequency, hematuria, or incontinence  NEUROLOGICAL: No headaches, memory loss, or tremors  SKIN: No itching, burning, rashes, or lesions   LYMPH NODES: No enlarged glands  ENDOCRINE: No heat or cold intolerance; No hair loss  MUSCULOSKELETAL: No myalgias  PSYCHIATRIC: No depression, anxiety, mood swings, or difficulty sleeping  HEME/LYMPH: No easy bruising, or bleeding gums  ALLERGY AND IMMUNOLOGIC: No hives or eczema    PAST MEDICAL & SURGICAL HISTORY:  Morbid obesity with BMI of 40.0-44.9, adult    Anxiety    GERD (gastroesophageal reflux disease)    Hypercholesterolemia    HTN (hypertension)    H/O total knee replacement, left  2015    Status post gastric banding      S/P cataract surgery, left  2016    S/P  section        SOCIAL HISTORY:  Lives: with spouse  Smoking Hx: none  ETOH consumption: none  Illicit Drug Use:  None    Allergies    No Known Allergies    Intolerances     Home Medications:  pantoprazole 40 mg oral delayed release tablet: 1 tab(s) orally once a day (10 Feb 2021 06:49)  rosuvastatin 5 mg oral capsule: 1 cap(s) orally once a day (10 Feb 2021 06:49)  sertraline 100 mg oral tablet: 1 tab(s) orally once a day (at bedtime) (10 Feb 2021 06:49)  valsartan-hydrochlorothiazide 80mg-12.5mg oral tablet: 1 tab(s) orally once a day (10 Feb 2021 06:49)    FAMILY HISTORY:  FH: type 2 diabetes  mother    Family history of cardiac pacemaker  father    PHYSICAL EXAM:  Vital Signs Last 24 Hrs  T(C): 36.9 (10 Feb 2021 06:47), Max: 36.9 (10 Feb 2021 06:47)  T(F): 98.4 (10 Feb 2021 06:47), Max: 98.4 (10 Feb 2021 06:47)  HR: 88 (10 Feb 2021 06:47) (88 - 88)  BP: 123/51 (10 Feb 2021 06:47) (123/51 - 123/51)  BP(mean): --  RR: 20 (10 Feb 2021 06:47) (20 - 20)  SpO2: 100% (10 Feb 2021 06:47) (100% - 100%)    GENERAL: NAD, well-groomed, well-developed, awake, alert, oriented x 3, fluent and coherent speech  EYES: EOMI, PERRLA, conjunctiva and sclera clear  ENMT: No tonsillar erythema, exudates, or enlargement; Moist mucous membranes, No lesions on oral mucosa  NECK: Supple, No JVD, No Cervical LAD, No thyromegaly, No thyroid nodules  NERVOUS SYSTEM:  Good concentration; Moving all 4 extremities; No gross sensory deficits, No facial droop  CHEST/LUNG: Clear to auscultation bilaterally; No rales, rhonchi, wheezing, or rubs  HEART: Regular rate and rhythm; No murmurs, rubs, or gallops  ABDOMEN: Nondistended, Bowel sounds absent, dressing c/d/i  EXTREMITIES:  2+ Peripheral Pulses, No clubbing, cyanosis, or edema    LABS:  reviewed in chart    EKG (was personally reviewed): yes, NSR                                             Information Obtained from:  EHR, Physical Chart, Patient at bedside (relevant EHR and Chart information verified with patient)    HPI:  59yo F who had lap band 2005 with subsequent malfunction, presented today for gastric band removal.  Hx of obesity with BMI 42, HTN, HLD.    REVIEW OF SYSTEMS:  CONSTITUTIONAL: No fever, weight loss, or fatigue  EYES: No eye pain, visual disturbances, or discharge  ENMT:  No difficulty hearing, tinnitus, vertigo; No sinus or throat pain  NECK: No pain or stiffness  BREASTS: No pain, masses, or nipple discharge  RESPIRATORY: No cough, wheezing, chills or hemoptysis; No shortness of breath  CARDIOVASCULAR: No chest pain, palpitations, dizziness, or leg swelling  GASTROINTESTINAL: No abdominal or epigastric pain. No nausea, vomiting, or hematemesis; No diarrhea or constipation. No melena or hematochezia.  GENITOURINARY: No dysuria, frequency, hematuria, or incontinence  NEUROLOGICAL: No headaches, memory loss, or tremors  SKIN: No itching, burning, rashes, or lesions   LYMPH NODES: No enlarged glands  ENDOCRINE: No heat or cold intolerance; No hair loss  MUSCULOSKELETAL: No myalgias  PSYCHIATRIC: No depression, anxiety, mood swings, or difficulty sleeping  HEME/LYMPH: No easy bruising, or bleeding gums  ALLERGY AND IMMUNOLOGIC: No hives or eczema    PAST MEDICAL & SURGICAL HISTORY:  Morbid obesity with BMI of 40.0-44.9, adult    Anxiety    GERD (gastroesophageal reflux disease)    Hypercholesterolemia    HTN (hypertension)    H/O total knee replacement, left  2015    Status post gastric banding      S/P cataract surgery, left  2016    S/P  section        SOCIAL HISTORY:  Lives: with spouse  Smoking Hx: none  ETOH consumption: none  Illicit Drug Use:  None    Allergies    No Known Allergies    Intolerances     Home Medications:  pantoprazole 40 mg oral delayed release tablet: 1 tab(s) orally once a day (10 Feb 2021 06:49)  rosuvastatin 5 mg oral capsule: 1 cap(s) orally once a day (10 Feb 2021 06:49)  sertraline 100 mg oral tablet: 1 tab(s) orally once a day (at bedtime) (10 Feb 2021 06:49)  valsartan-hydrochlorothiazide 80mg-12.5mg oral tablet: 1 tab(s) orally once a day (10 Feb 2021 06:49)    FAMILY HISTORY:  FH: type 2 diabetes  mother    Family history of cardiac pacemaker  father    PHYSICAL EXAM:  Vital Signs Last 24 Hrs  T(C): 36.9 (10 Feb 2021 06:47), Max: 36.9 (10 Feb 2021 06:47)  T(F): 98.4 (10 Feb 2021 06:47), Max: 98.4 (10 Feb 2021 06:47)  HR: 88 (10 Feb 2021 06:47) (88 - 88)  BP: 123/51 (10 Feb 2021 06:47) (123/51 - 123/51)  BP(mean): --  RR: 20 (10 Feb 2021 06:47) (20 - 20)  SpO2: 100% (10 Feb 2021 06:47) (100% - 100%)    GENERAL: NAD, well-groomed, well-developed, awake, alert, oriented x 3, fluent and coherent speech  EYES: EOMI, PERRLA, conjunctiva and sclera clear  ENMT: No tonsillar erythema, exudates, or enlargement; Moist mucous membranes, No lesions on oral mucosa  NECK: Supple, No JVD, No Cervical LAD, No thyromegaly, No thyroid nodules  NERVOUS SYSTEM:  Good concentration; Moving all 4 extremities; No gross sensory deficits, No facial droop  CHEST/LUNG: Clear to auscultation bilaterally; No rales, rhonchi, wheezing, or rubs  HEART: Regular rate and rhythm; No murmurs, rubs, or gallops  ABDOMEN: Nondistended, Bowel sounds absent, dressing c/d/i  EXTREMITIES:  2+ Peripheral Pulses, No clubbing, cyanosis, or edema    LABS:  reviewed in chart    EKG (was personally reviewed): yes, NSR                                             Information Obtained from:  EHR, Physical Chart, Patient at bedside (relevant EHR and Chart information verified with patient)    HPI:  59yo F who had lap band 2005 with subsequent malfunction, presented today for gastric band removal.  Hx of obesity with BMI 42, HTN, HLD.    REVIEW OF SYSTEMS:  CONSTITUTIONAL: No fever, weight loss, or fatigue  EYES: No eye pain, visual disturbances, or discharge  ENMT:  No difficulty hearing, tinnitus, vertigo; No sinus or throat pain  NECK: No pain or stiffness  BREASTS: No pain, masses, or nipple discharge  RESPIRATORY: No cough, wheezing, chills or hemoptysis; No shortness of breath  CARDIOVASCULAR: No chest pain, palpitations, dizziness, or leg swelling  GASTROINTESTINAL: No abdominal or epigastric pain. No nausea, vomiting, or hematemesis; No diarrhea or constipation. No melena or hematochezia.  GENITOURINARY: No dysuria, frequency, hematuria, or incontinence  NEUROLOGICAL: No headaches, memory loss, or tremors  SKIN: No itching, burning, rashes, or lesions   LYMPH NODES: No enlarged glands  ENDOCRINE: No heat or cold intolerance; No hair loss  MUSCULOSKELETAL: No myalgias  PSYCHIATRIC: No depression, anxiety, mood swings, or difficulty sleeping  HEME/LYMPH: No easy bruising, or bleeding gums  ALLERGY AND IMMUNOLOGIC: No hives or eczema    PAST MEDICAL & SURGICAL HISTORY:  Morbid obesity with BMI of 40.0-44.9, adult    Anxiety    GERD (gastroesophageal reflux disease)    Hypercholesterolemia    HTN (hypertension)    H/O total knee replacement, left  2015    Status post gastric banding      S/P cataract surgery, left  2016    S/P  section        SOCIAL HISTORY:  Lives: with spouse  Smoking Hx: none  ETOH consumption: none  Illicit Drug Use:  None    Allergies    No Known Allergies    Intolerances     Home Medications:  pantoprazole 40 mg oral delayed release tablet: 1 tab(s) orally once a day (10 Feb 2021 06:49)  rosuvastatin 5 mg oral capsule: 1 cap(s) orally once a day (10 Feb 2021 06:49)  sertraline 100 mg oral tablet: 1 tab(s) orally once a day (at bedtime) (10 Feb 2021 06:49)  valsartan-hydrochlorothiazide 80mg-12.5mg oral tablet: 1 tab(s) orally once a day (10 Feb 2021 06:49)    FAMILY HISTORY:  FH: type 2 diabetes  mother    Family history of cardiac pacemaker  father    PHYSICAL EXAM:  Vital Signs Last 24 Hrs  T(C): 36.9 (10 Feb 2021 06:47), Max: 36.9 (10 Feb 2021 06:47)  T(F): 98.4 (10 Feb 2021 06:47), Max: 98.4 (10 Feb 2021 06:47)  HR: 88 (10 Feb 2021 06:47) (88 - 88)  BP: 123/51 (10 Feb 2021 06:47) (123/51 - 123/51)  BP(mean): --  RR: 20 (10 Feb 2021 06:47) (20 - 20)  SpO2: 100% (10 Feb 2021 06:47) (100% - 100%)    GENERAL: NAD, well-groomed, well-developed, drowsy, awakened easily with verbal stimuli  EYES: EOMI, PERRLA, conjunctiva and sclera clear  ENMT: No tonsillar erythema, exudates, or enlargement; Moist mucous membranes, No lesions on oral mucosa  NECK: Supple, No JVD, No Cervical LAD, No thyromegaly, No thyroid nodules  NERVOUS SYSTEM:  Good concentration; Moving all 4 extremities; No gross sensory deficits, No facial droop  CHEST/LUNG: Clear to auscultation bilaterally; No rales, rhonchi, wheezing, or rubs  HEART: Regular rate and rhythm; No murmurs, rubs, or gallops  ABDOMEN: Nondistended, Bowel sounds absent, dressing c/d/i  EXTREMITIES:  2+ Peripheral Pulses, No clubbing, cyanosis, or edema    LABS:  reviewed in chart    EKG (was personally reviewed): yes, NSR

## 2021-02-10 NOTE — ASU DISCHARGE PLAN (ADULT/PEDIATRIC) - ASU DC SPECIAL INSTRUCTIONSFT
REST! Apply ice packs to incision sites 20 mins on, 20 mins off every 4 hours for first 24 hours.  If taking narcotic pain medications, may take COLACE (OTC stool softener) as directed to prevent constipation.  Increase ambulation and use incentive spirometer as directed.    Please call Dr. ISABELLA Azevedo's office ([phone]) to schedule a follow-up appointment to be seen in 1 week.

## 2021-02-10 NOTE — CONSULT NOTE ADULT - ASSESSMENT
POD#0 s/p Sleeve Gastrectomy  - Pain control  - Bowel regimen  - Incentive spirometry  - advance diet per surgery  - VTE PPx - per surgery    HTN  - hold valsartan-hctz until she can take pills  - can use IV metoprolol 5mg Q6 or IV Vasotec 1.25mg Q6 with hold parameters    HLD  - resume statin once able to take pills POD#0 s/p Lap Band and Port Removal and DES  - Pain control  - Bowel regimen  - Incentive spirometry  - advance diet per surgery  - VTE PPx - per surgery    HTN  - hold valsartan-hctz until she can take pills  - can use IV metoprolol 5mg Q6 or IV Vasotec 1.25mg Q6 with hold parameters    HLD  - resume statin once able to take pills

## 2021-02-11 ENCOUNTER — TRANSCRIPTION ENCOUNTER (OUTPATIENT)
Age: 61
End: 2021-02-11

## 2021-02-11 VITALS
OXYGEN SATURATION: 93 % | SYSTOLIC BLOOD PRESSURE: 113 MMHG | DIASTOLIC BLOOD PRESSURE: 67 MMHG | HEART RATE: 98 BPM | RESPIRATION RATE: 18 BRPM | TEMPERATURE: 98 F

## 2021-02-11 LAB
ANION GAP SERPL CALC-SCNC: 7 MMOL/L — SIGNIFICANT CHANGE UP (ref 5–17)
BUN SERPL-MCNC: 12 MG/DL — SIGNIFICANT CHANGE UP (ref 7–23)
CALCIUM SERPL-MCNC: 8.5 MG/DL — SIGNIFICANT CHANGE UP (ref 8.4–10.5)
CHLORIDE SERPL-SCNC: 106 MMOL/L — SIGNIFICANT CHANGE UP (ref 96–108)
CO2 SERPL-SCNC: 28 MMOL/L — SIGNIFICANT CHANGE UP (ref 22–31)
CREAT SERPL-MCNC: 0.77 MG/DL — SIGNIFICANT CHANGE UP (ref 0.5–1.3)
GLUCOSE SERPL-MCNC: 86 MG/DL — SIGNIFICANT CHANGE UP (ref 70–99)
HCT VFR BLD CALC: 32 % — LOW (ref 34.5–45)
HGB BLD-MCNC: 9.7 G/DL — LOW (ref 11.5–15.5)
MCHC RBC-ENTMCNC: 25.6 PG — LOW (ref 27–34)
MCHC RBC-ENTMCNC: 30.3 GM/DL — LOW (ref 32–36)
MCV RBC AUTO: 84.4 FL — SIGNIFICANT CHANGE UP (ref 80–100)
NRBC # BLD: 0 /100 WBCS — SIGNIFICANT CHANGE UP (ref 0–0)
PLATELET # BLD AUTO: 264 K/UL — SIGNIFICANT CHANGE UP (ref 150–400)
POTASSIUM SERPL-MCNC: 3.6 MMOL/L — SIGNIFICANT CHANGE UP (ref 3.5–5.3)
POTASSIUM SERPL-SCNC: 3.6 MMOL/L — SIGNIFICANT CHANGE UP (ref 3.5–5.3)
RBC # BLD: 3.79 M/UL — LOW (ref 3.8–5.2)
RBC # FLD: 15.3 % — HIGH (ref 10.3–14.5)
SODIUM SERPL-SCNC: 141 MMOL/L — SIGNIFICANT CHANGE UP (ref 135–145)
SURGICAL PATHOLOGY STUDY: SIGNIFICANT CHANGE UP
WBC # BLD: 8.19 K/UL — SIGNIFICANT CHANGE UP (ref 3.8–10.5)
WBC # FLD AUTO: 8.19 K/UL — SIGNIFICANT CHANGE UP (ref 3.8–10.5)

## 2021-02-11 PROCEDURE — 94664 DEMO&/EVAL PT USE INHALER: CPT

## 2021-02-11 PROCEDURE — 85027 COMPLETE CBC AUTOMATED: CPT

## 2021-02-11 PROCEDURE — 80048 BASIC METABOLIC PNL TOTAL CA: CPT

## 2021-02-11 PROCEDURE — 86901 BLOOD TYPING SEROLOGIC RH(D): CPT

## 2021-02-11 PROCEDURE — 36415 COLL VENOUS BLD VENIPUNCTURE: CPT

## 2021-02-11 PROCEDURE — 86900 BLOOD TYPING SEROLOGIC ABO: CPT

## 2021-02-11 PROCEDURE — 86850 RBC ANTIBODY SCREEN: CPT

## 2021-02-11 PROCEDURE — 99232 SBSQ HOSP IP/OBS MODERATE 35: CPT

## 2021-02-11 PROCEDURE — 88300 SURGICAL PATH GROSS: CPT

## 2021-02-11 RX ORDER — SERTRALINE 25 MG/1
100 TABLET, FILM COATED ORAL DAILY
Refills: 0 | Status: DISCONTINUED | OUTPATIENT
Start: 2021-02-11 | End: 2021-02-11

## 2021-02-11 RX ORDER — OXYCODONE HYDROCHLORIDE 5 MG/1
5 TABLET ORAL EVERY 6 HOURS
Refills: 0 | Status: DISCONTINUED | OUTPATIENT
Start: 2021-02-11 | End: 2021-02-11

## 2021-02-11 RX ORDER — SERTRALINE 25 MG/1
1 TABLET, FILM COATED ORAL
Qty: 0 | Refills: 0 | DISCHARGE

## 2021-02-11 RX ORDER — PANTOPRAZOLE SODIUM 20 MG/1
1 TABLET, DELAYED RELEASE ORAL
Qty: 0 | Refills: 0 | DISCHARGE

## 2021-02-11 RX ADMIN — OXYCODONE HYDROCHLORIDE 5 MILLIGRAM(S): 5 TABLET ORAL at 13:20

## 2021-02-11 RX ADMIN — Medication 400 MILLIGRAM(S): at 05:38

## 2021-02-11 RX ADMIN — ONDANSETRON 4 MILLIGRAM(S): 8 TABLET, FILM COATED ORAL at 05:39

## 2021-02-11 RX ADMIN — ONDANSETRON 4 MILLIGRAM(S): 8 TABLET, FILM COATED ORAL at 13:20

## 2021-02-11 RX ADMIN — Medication 516 MILLIGRAM(S): at 09:59

## 2021-02-11 RX ADMIN — SODIUM CHLORIDE 150 MILLILITER(S): 9 INJECTION, SOLUTION INTRAVENOUS at 03:26

## 2021-02-11 RX ADMIN — ENOXAPARIN SODIUM 40 MILLIGRAM(S): 100 INJECTION SUBCUTANEOUS at 05:38

## 2021-02-11 RX ADMIN — HYDROMORPHONE HYDROCHLORIDE 0.5 MILLIGRAM(S): 2 INJECTION INTRAMUSCULAR; INTRAVENOUS; SUBCUTANEOUS at 03:26

## 2021-02-11 RX ADMIN — PANTOPRAZOLE SODIUM 40 MILLIGRAM(S): 20 TABLET, DELAYED RELEASE ORAL at 13:20

## 2021-02-11 RX ADMIN — SERTRALINE 100 MILLIGRAM(S): 25 TABLET, FILM COATED ORAL at 13:20

## 2021-02-11 NOTE — DISCHARGE NOTE PROVIDER - NSDCCPTREATMENT_GEN_ALL_CORE_FT
PRINCIPAL PROCEDURE  Procedure: Laparoscopic removal of adjustable gastric band and subcutaneous gastric port device  Findings and Treatment: Tolerated procedure well      SECONDARY PROCEDURE  Procedure: Laparoscopic repair of hiatal hernia without using mesh  Findings and Treatment: Tolerated procedure well

## 2021-02-11 NOTE — PROGRESS NOTE ADULT - SUBJECTIVE AND OBJECTIVE BOX
INTERVAL HPI/OVERNIGHT EVENTS:   Patient seen and examined.  Tolerating small amounts of warm water.  Notes burning pain from throat down into epigastric area.  No flatus yet.  Voiding.  No fevers, chills, sweats, dizziness, HA, changes in vision, cp, palpitations, sob, persistent cough, n/v/d, dysuria, focal weakness, or calf pain.     REVIEW OF SYSTEMS:  See HPI,  all others negative    PHYSICAL EXAM:  Vital Signs Last 24 Hrs  T(C): 36.4 (2021 07:35), Max: 37.1 (10 Feb 2021 17:33)  T(F): 97.6 (2021 07:35), Max: 98.8 (10 Feb 2021 17:33)  HR: 87 (2021 07:35) (74 - 99)  BP: 102/66 (2021 07:35) (102/66 - 141/80)  BP(mean): --  RR: 18 (2021 07:35) (11 - 22)  SpO2: 93% (2021 07:35) (93% - 100%)    GENERAL: NAD, well-groomed, well-developed, awake, alert, oriented x 3, fluent and coherent speech  EYES: EOMI, PERRLA, conjunctiva and sclera clear  ENMT: No tonsillar erythema, exudates, or enlargement; Moist mucous membranes, Good dentition, No lesions seen on oral mucosa  NECK: Supple, No JVD, No Cervical LAD, No thyromegaly, No thyroid nodules felt  NERVOUS SYSTEM:  Good concentration; Moving all 4 extremities against gravity and resistance; No gross sensory deficits, No facial droop  CHEST/LUNG: Clear to auscultation bilaterally with good air entry; No rales, rhonchi, wheezing, or rubs  HEART: Regular rate and rhythm; No murmurs, rubs, or gallops  ABDOMEN: Soft, tender to palpation, dressing c/d/i, Bowel sounds not heard over any quadrant, No palpable masses or organomegaly, No bruits  EXTREMITIES:  2+ Peripheral Pulses, No clubbing, cyanosis, or edema, no calf tenderness in either leg    Diagnostic Testin.7    8.19  )-----------( 264      ( 2021 07:52 )             32.0     2021 07:51    141    |  106    |  12     ----------------------------<  86     3.6     |  28     |  0.77     Ca    8.5        2021 07:51

## 2021-02-11 NOTE — DISCHARGE NOTE PROVIDER - NSDCMRMEDTOKEN_GEN_ALL_CORE_FT
pantoprazole 40 mg oral delayed release tablet: 1 tab(s) orally once a day  rosuvastatin 5 mg oral capsule: 1 cap(s) orally once a day  sertraline 100 mg oral tablet: 1 tab(s) orally once a day (at bedtime)  valsartan-hydrochlorothiazide 80mg-12.5mg oral tablet: 1 tab(s) orally once a day   acetaminophen 500 mg/15 mL oral liquid: 15 milliliter(s) orally every 6 hours for mild pain not to exceed 5 days   omeprazole 20 mg oral delayed release capsule: 1 cap(s) orally once a day  oxyCODONE 5 mg oral tablet: 1 tab(s) orally every 6 hours as needed for moderate to severe pain   rosuvastatin 5 mg oral capsule: 1 cap(s) orally once a day  sertraline 100 mg oral tablet: 1 tab(s) orally once a day in am  sertraline 50 mg oral tablet: 1 tab(s) orally once a day in pm   valsartan-hydrochlorothiazide 80mg-12.5mg oral tablet: 1 tab(s) orally once a day
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
## 2021-02-11 NOTE — PROGRESS NOTE ADULT - REASON FOR ADMISSION
59 yo M F with PMHx of morbid obesity admitted to [facility] for scheduled s/p single stage revision surgery - removal of lap band and port converted to lap sleeve gastrectomy with intra op EGD and hiatal hernia repair. Lap Sleeve Gastrectomy was aborted due to extensive adhesions.
lap band removal

## 2021-02-11 NOTE — DISCHARGE NOTE NURSING/CASE MANAGEMENT/SOCIAL WORK - PATIENT PORTAL LINK FT
You can access the FollowMyHealth Patient Portal offered by [facility] by registering at the following website: [email or web address]. By joining [email or web address] FollowMyHealth portal, you will also be able to view your health information using other applications (apps) compatible with our system.

## 2021-02-11 NOTE — DISCHARGE NOTE PROVIDER - NSDCCPCAREPLAN_GEN_ALL_CORE_FT
PRINCIPAL DISCHARGE DIAGNOSIS  Diagnosis: Morbid obesity  Assessment and Plan of Treatment: Instructed to ambulate and use incentive spirometry frequently. Ice packs to abdominal wall and shoulders as needed for discomfort. Cont bariatric diet and follow nutritional guidelines as instructed. Pain meds as needed by MD. Pt instructed  to follow up with Dr. Azevedo  in 1 week.      SECONDARY DISCHARGE DIAGNOSES  Diagnosis: History of removal of laparoscopic gastric banding device  Assessment and Plan of Treatment: Instructed to ambulate and use incentive spirometry frequently. Ice packs to abdominal wall and shoulders as needed for discomfort. Cont bariatric Continue Bariatric Clear Diet . Plan to start Protein shakes at home . Avoid long heat exposure -outdoors. and follow nutritional guidelines as instructed. Pain meds as needed by MD. Pt instructed  to follow up with Dr. Azevedo  in 1 week.

## 2021-02-11 NOTE — DISCHARGE NOTE PROVIDER - HOSPITAL COURSE
59 yo F with PMHx of morbid obesity admitted to [facility] for scheduled Removal of Lap Band and Port/ Lysis of Adhesions /  Hiatal Hernia Repair. Lap Sleeve Gastrectomy was aborted due to extensive adhesion. Post operatively patient did well, good urine output and ambulating well on floor. Pt advanced to bariatric clears / full liquids which she tolerated . Nutritional guidelines were reviewed with the nutritionist. Patient felt ready for discharge to home. Pt instructed to drink small frequent amounts, start protein drinks and follow dietary guidelines. Instructed to ambulate and use incentive spirometry frequently. Pt to follow up with Dr. Azevedo in 1 week and call with any questions or concerns. 59 yo F with PMHx of morbid obesity admitted to [facility] for scheduled Removal of Lap Band and Port/ Lysis of Adhesions /  Hiatal Hernia Repair. Lap Sleeve Gastrectomy was aborted due to extensive adhesion. Post operatively patient did well, good urine output and ambulating well on floor. Pt advanced to bariatric clears / full liquids which she tolerated . Nutritional guidelines were reviewed with the nutritionist. Patient reported increase discomfort and pain post procedure on post op day # 0.Patient felt ready for discharge to home on post op day # 1.  Pt instructed to drink small frequent amounts, start protein drinks and follow dietary guidelines. Instructed to ambulate and use incentive spirometry frequently. Pt to follow up with Dr. Azevedo in 1 week and call with any questions or concerns.

## 2021-02-11 NOTE — PROGRESS NOTE ADULT - ASSESSMENT
POD#1 s/p Lap Band and Port Removal and DES  - Pain control  - Bowel regimen  - Incentive spirometry  - advance diet per surgery  - PPI  - VTE PPx - per surgery    HTN  - hold valsartan-hctz until she can take pills  - agree with using IV Vasotec 1.25mg Q6 with hold parameters    HLD  - resume statin once able to take pills

## 2021-02-11 NOTE — PROGRESS NOTE ADULT - SUBJECTIVE AND OBJECTIVE BOX
BARIATRIC SURGERY     Pre-Op Dx: Morbid obesity/Bariatric Surgery Status.  Procedure: S/P Removal of Lap Band and Port/ Lysis of Adhesions /  Hiatal Hernia Repair.  Surgeon: Jolly FRITZ       Subjective:    60 y Female post op day # 1 s/p Removal of Lap Band and Port/ Lysis of Adhesions /  Hiatal Hernia Repair. Lap Sleeve Gastrectomy was aborted due to extensive adhesions. Minimal incisional discomfort. Denies any nausea or vomiting. Patient has started bariatric clear diet this am and tolerating without any issues.  Ambulating on Floor/ Voided this am./ Utilizing incentive spirometry as instructed. Pt is utilizing an abdominal binder which he indicates is helping with discomfort.     LABS:                        9.7    8.19  )-----------( 264      ( 11 Feb 2021 07:52 )             32.0     02-11    141  |  106  |  12  ----------------------------<  86  3.6   |  28  |  0.77    Ca    8.5      11 Feb 2021 07:51          Vital Signs Last 24 Hrs  T(C): 36.4 (11 Feb 2021 07:35), Max: 37.1 (10 Feb 2021 17:33)  T(F): 97.6 (11 Feb 2021 07:35), Max: 98.8 (10 Feb 2021 17:33)  HR: 87 (11 Feb 2021 07:35) (74 - 99)  BP: 102/66 (11 Feb 2021 07:35) (102/66 - 141/80)  BP(mean): --  RR: 18 (11 Feb 2021 07:35) (11 - 22)  SpO2: 93% (11 Feb 2021 07:35) (93% - 100%)    02-10 @ 07:01  -  02-11 @ 07:00  --------------------------------------------------------  IN: 3120 mL / OUT: 1320 mL / NET: 1800 mL        Physical Exam:  General: Alert & Oriented x 3.  NAD, resting comfortably in bed.    Abdominal: Soft - Non tender - No swelling noted. Incision site clean / dry /intact. Normoactive Bowel Sounds.  Extremities: No swelling/ edema / erythema noted bilateral upper / lower extremities.  Neuro: Motor / Sensory function grossly intact bilateral lower/ upper extremities.          Assessment: 60 y  Female Post OP Day # 1 S/P Removal of Lap Band and Port/ Lysis of Adhesions /  Hiatal Hernia Repair. Lap Sleeve Gastrectomy was aborted due to extensive adhesions. History of anxiety/ depression. Pt is hemodynamically stable.    Plan:  Cont GI / DVT prophylaxis.   Dietician consult.   Cont  OOB / ambulation on floor / incentive spirometry.  Cont bariatric clear and full liquid diet at home.   Discussed and reviewed home meds  and pain medication with patient . Pt is aware she can swallow her pills at home.   Pt was given Sertraline 100 mg po qd. Will take 50 mg dose this evening at home.   Possibly discharged later today .  Dr. Azevedo   saw pt.

## 2021-02-11 NOTE — DISCHARGE NOTE PROVIDER - NSDCFUSCHEDAPPT_GEN_ALL_CORE_FT
STEPH SAENZ ; 02/18/2021 ; [facility] [address] [facility] ; 03/18/2021 ; [facility] Surg Bariatric [facility] [facility] ; 04/08/2021 ; [facility] [address]

## 2021-02-11 NOTE — DISCHARGE NOTE PROVIDER - REASON FOR ADMISSION
59 yo F with PMHx of morbid obesity admitted to [facility] for scheduled Removal of Lap Band and Port/ Lysis of Adhesions /  Hiatal Hernia Repair. Lap Sleeve Gastrectomy was aborted due to extensive adhesion.

## 2021-02-11 NOTE — DISCHARGE NOTE PROVIDER - CARE PROVIDER_API CALL
Bebe Azevedo (MD)  Surgery  [address] [place] [address]  Phone: [phone]  Fax: [phone]  Follow Up Time:

## 2021-02-12 ENCOUNTER — NON-APPOINTMENT (OUTPATIENT)
Age: 61
End: 2021-02-12

## 2021-02-15 ENCOUNTER — RX RENEWAL (OUTPATIENT)
Age: 61
End: 2021-02-15

## 2021-02-17 ENCOUNTER — APPOINTMENT (OUTPATIENT)
Dept: BARIATRICS | Facility: CLINIC | Age: 61
End: 2021-02-17
Payer: COMMERCIAL

## 2021-02-17 VITALS
TEMPERATURE: 97.3 F | DIASTOLIC BLOOD PRESSURE: 90 MMHG | OXYGEN SATURATION: 97 % | BODY MASS INDEX: 43.34 KG/M2 | HEART RATE: 78 BPM | WEIGHT: 260.14 LBS | HEIGHT: 65 IN | SYSTOLIC BLOOD PRESSURE: 122 MMHG

## 2021-02-17 DIAGNOSIS — Z98.84 BARIATRIC SURGERY STATUS: ICD-10-CM

## 2021-02-17 PROCEDURE — 99024 POSTOP FOLLOW-UP VISIT: CPT

## 2021-02-17 RX ORDER — OXYCODONE 5 MG/1
5 TABLET ORAL
Qty: 6 | Refills: 0 | Status: DISCONTINUED | COMMUNITY
Start: 2021-02-02 | End: 2021-02-17

## 2021-02-17 RX ORDER — ONDANSETRON 4 MG/1
4 TABLET, ORALLY DISINTEGRATING ORAL 4 TIMES DAILY
Qty: 15 | Refills: 0 | Status: DISCONTINUED | COMMUNITY
Start: 2021-02-02 | End: 2021-02-17

## 2021-02-17 NOTE — DATA REVIEWED
[FreeTextEntry1] : Total opioids used       3         total # taken (oxycodone 5 mg)\par \par MSO4 equivalent      22.5            mg (total # taken). \par \par Unused opioids returned?   No\par \par Additional opioid refill?  No\par \par 30 -day post opioid use? NA\par \par Patient was informed where to return unused opioids

## 2021-02-17 NOTE — PHYSICAL EXAM
[Obese, well nourished, in no acute distress] : obese, well nourished, in no acute distress [Normal] : affect appropriate [de-identified] : EOMI, anicteric

## 2021-02-17 NOTE — REVIEW OF SYSTEMS
[Recent Change In Weight] : ~T recent weight change [Muscle Pain] : muscle pain [Negative] : Psychiatric [Fever] : no fever [Chills] : no chills [Night Sweats] : no night sweats [Fatigue] : no fatigue [Eye Pain] : no eye pain [Red Eyes] : eyes not red [Vision Problems] : no vision problems [Confused] : no confusion [Dizziness] : no dizziness [Fainting] : no fainting [Difficulty Walking] : no difficulty walking [FreeTextEntry2] : weight loss [de-identified] : headache with shoulder and neck pain

## 2021-02-17 NOTE — ASSESSMENT
[FreeTextEntry1] : 61 yo F one week s/p laparoscopic removal of lap-band and port presents today for first post operative visit.  She  is doing well and wants to still proceed with sleeve gastrectomy. Incisions are healing appropriately. \par \par Advance to regular low fat, low carbohydrate and high protein diet. \par Increase ambulation and not to do any heavy lifting until 6 weeks post op\par Follow up with nutritionist\par Follow up in 3-4 weeks - will discuss scheduling sleeve gastrectomy at that visit\par Call with any questions or concerns

## 2021-02-17 NOTE — HISTORY OF PRESENT ILLNESS
[Procedure: ___] : Procedure performed: [unfilled]  [Date of Surgery: ___] : Date of Surgery:   [unfilled] [Surgeon Name:   ___] : Surgeon Name: Dr. NESS [de-identified] : 59 yo F one week s/p laparoscopic removal of lap-band and port presents today for first post operative visit. Unable to proceed with sleeve gastrectomy because of abdominal adhesions. Lost weight since last visit.  Tolerating soft foods and is drinking adequate water daily. Complains of shoulder and neck pain that causes headaches. No shortness of breath, calf pain, nausea, vomiting, diarrhea and constipation. Ambulating frequently for exercise. Got first dose of COVID vaccine yesterday. [de-identified] : LAP BAND SURGERY 10 CM , Date of Surgery: 3/21/2005, Surgeon Name: Dr. HU/ TI . Pre-op weight was 250 lbs.

## 2021-03-07 ENCOUNTER — RX RENEWAL (OUTPATIENT)
Age: 61
End: 2021-03-07

## 2021-03-11 ENCOUNTER — RX RENEWAL (OUTPATIENT)
Age: 61
End: 2021-03-11

## 2021-03-16 ENCOUNTER — APPOINTMENT (OUTPATIENT)
Dept: BARIATRICS | Facility: CLINIC | Age: 61
End: 2021-03-16
Payer: COMMERCIAL

## 2021-03-16 VITALS — WEIGHT: 261 LBS | HEIGHT: 65 IN | BODY MASS INDEX: 43.49 KG/M2

## 2021-03-16 PROCEDURE — 99024 POSTOP FOLLOW-UP VISIT: CPT | Mod: 95

## 2021-03-16 RX ORDER — OMEPRAZOLE 20 MG/1
20 CAPSULE, DELAYED RELEASE ORAL DAILY
Qty: 30 | Refills: 1 | Status: DISCONTINUED | COMMUNITY
Start: 2021-02-02 | End: 2021-03-16

## 2021-03-16 RX ORDER — PANTOPRAZOLE 40 MG/1
40 TABLET, DELAYED RELEASE ORAL DAILY
Qty: 1 | Refills: 1 | Status: DISCONTINUED | COMMUNITY
Start: 2020-12-07 | End: 2021-03-16

## 2021-03-16 NOTE — DATA REVIEWED
[FreeTextEntry1] : Total opioids used       3         total # taken (oxycodone 5 mg)\par \par MSO4 equivalent      22.5            mg (total # taken). \par \par Unused opioids returned?   No\par \par Additional opioid refill?  No\par \par 30 -day post opioid use? No\par \par Patient was informed where to return unused opioids

## 2021-03-16 NOTE — REVIEW OF SYSTEMS
[Negative] : Psychiatric [Fever] : no fever [Chills] : no chills [Night Sweats] : no night sweats [Fatigue] : no fatigue [Recent Change In Weight] : ~T no recent weight change [Eye Pain] : no eye pain [Red Eyes] : eyes not red [Vision Problems] : no vision problems [Muscle Pain] : no muscle pain [Confused] : no confusion [Dizziness] : no dizziness [Fainting] : no fainting [Difficulty Walking] : no difficulty walking

## 2021-03-16 NOTE — HISTORY OF PRESENT ILLNESS
[Home] : at home, [unfilled] , at the time of the visit. [Medical Office: ([facility])___] : at the medical office located in  [Verbal consent obtained from patient] : the patient, [unfilled] [Procedure: ___] : Procedure performed: [unfilled]  [Date of Surgery: ___] : Date of Surgery:   [unfilled] [Surgeon Name:   ___] : Surgeon Name: Dr. NESS [de-identified] : 61 yo F one month s/p laparoscopic removal of lap-band and port presents today. Unable to proceed with sleeve gastrectomy because of abdominal adhesions. Maintained weight since last visit.  Tolerating regular foods - having three protein rich meals a day - and is drinking adequate water daily. Continues to take PPI and no acid reflux symptoms. No shortness of breath, calf pain, nausea, vomiting, diarrhea and constipation. Ambulating frequently for exercise. Getting second dose of COVID vaccine today. [de-identified] : LAP BAND SURGERY 10 CM , Date of Surgery: 3/21/2005, Surgeon Name: Dr. HU/ TI . Pre-op weight was 250 lbs.

## 2021-03-16 NOTE — ASSESSMENT
[FreeTextEntry1] : 61 yo F one month s/p laparoscopic removal of lap-band and port. Doing well and wants to still proceed with sleeve gastrectomy. \par \par Schedule surgery for May - repeat workup labs\par Continue regular low fat, low carbohydrate and high protein diet. \par Increase ambulation and not to do any heavy lifting until 6 weeks post op\par Follow up with nutritionist\par Stop PPI\par Follow up in 4 weeks for preop visit\par Call with any questions or concerns. \par

## 2021-03-16 NOTE — PHYSICAL EXAM
[Obese, well nourished, in no acute distress] : obese, well nourished, in no acute distress [Normal] : affect appropriate [de-identified] : EOMI, anicteric

## 2021-03-17 ENCOUNTER — RX RENEWAL (OUTPATIENT)
Age: 61
End: 2021-03-17

## 2021-04-04 ENCOUNTER — RX RENEWAL (OUTPATIENT)
Age: 61
End: 2021-04-04

## 2021-04-05 ENCOUNTER — RX RENEWAL (OUTPATIENT)
Age: 61
End: 2021-04-05

## 2021-04-08 ENCOUNTER — APPOINTMENT (OUTPATIENT)
Dept: BARIATRICS/WEIGHT MGMT | Facility: CLINIC | Age: 61
End: 2021-04-08
Payer: COMMERCIAL

## 2021-04-08 VITALS — BODY MASS INDEX: 44.48 KG/M2 | WEIGHT: 267 LBS | HEIGHT: 65 IN

## 2021-04-08 PROCEDURE — 97803 MED NUTRITION INDIV SUBSEQ: CPT | Mod: 95

## 2021-04-14 ENCOUNTER — APPOINTMENT (OUTPATIENT)
Dept: BARIATRICS | Facility: CLINIC | Age: 61
End: 2021-04-14
Payer: COMMERCIAL

## 2021-04-14 VITALS
HEART RATE: 84 BPM | DIASTOLIC BLOOD PRESSURE: 80 MMHG | OXYGEN SATURATION: 96 % | WEIGHT: 266.31 LBS | SYSTOLIC BLOOD PRESSURE: 110 MMHG | TEMPERATURE: 97 F | BODY MASS INDEX: 44.37 KG/M2 | HEIGHT: 65 IN

## 2021-04-14 PROCEDURE — 99214 OFFICE O/P EST MOD 30 MIN: CPT | Mod: 24

## 2021-04-14 PROCEDURE — 99072 ADDL SUPL MATRL&STAF TM PHE: CPT

## 2021-04-16 LAB
25(OH)D3 SERPL-MCNC: 56.8 NG/ML
ALBUMIN SERPL ELPH-MCNC: 4 G/DL
ALP BLD-CCNC: 122 U/L
ALT SERPL-CCNC: 23 U/L
ANION GAP SERPL CALC-SCNC: 11 MMOL/L
AST SERPL-CCNC: 16 U/L
BASOPHILS # BLD AUTO: 0.03 K/UL
BASOPHILS NFR BLD AUTO: 0.5 %
BILIRUB SERPL-MCNC: 0.2 MG/DL
BUN SERPL-MCNC: 16 MG/DL
CALCIUM SERPL-MCNC: 9.8 MG/DL
CHLORIDE SERPL-SCNC: 101 MMOL/L
CHOLEST SERPL-MCNC: 191 MG/DL
CO2 SERPL-SCNC: 28 MMOL/L
CREAT SERPL-MCNC: 0.96 MG/DL
EOSINOPHIL # BLD AUTO: 0.12 K/UL
EOSINOPHIL NFR BLD AUTO: 2.1 %
ESTIMATED AVERAGE GLUCOSE: 111 MG/DL
FERRITIN SERPL-MCNC: 51 NG/ML
FOLATE SERPL-MCNC: 12.1 NG/ML
GLUCOSE SERPL-MCNC: 90 MG/DL
HBA1C MFR BLD HPLC: 5.5 %
HCT VFR BLD CALC: 37.6 %
HDLC SERPL-MCNC: 66 MG/DL
HGB BLD-MCNC: 11.4 G/DL
IMM GRANULOCYTES NFR BLD AUTO: 0.4 %
IRON SATN MFR SERPL: 10 %
IRON SERPL-MCNC: 35 UG/DL
LDLC SERPL CALC-MCNC: 112 MG/DL
LYMPHOCYTES # BLD AUTO: 2.23 K/UL
LYMPHOCYTES NFR BLD AUTO: 39.6 %
MAN DIFF?: NORMAL
MCHC RBC-ENTMCNC: 25.3 PG
MCHC RBC-ENTMCNC: 30.3 GM/DL
MCV RBC AUTO: 83.6 FL
MONOCYTES # BLD AUTO: 0.44 K/UL
MONOCYTES NFR BLD AUTO: 7.8 %
NEUTROPHILS # BLD AUTO: 2.79 K/UL
NEUTROPHILS NFR BLD AUTO: 49.6 %
NONHDLC SERPL-MCNC: 125 MG/DL
PLATELET # BLD AUTO: 266 K/UL
POTASSIUM SERPL-SCNC: 4.2 MMOL/L
PROT SERPL-MCNC: 7.1 G/DL
RBC # BLD: 4.5 M/UL
RBC # FLD: 16.4 %
SODIUM SERPL-SCNC: 140 MMOL/L
T4 FREE SERPL-MCNC: 1 NG/DL
TIBC SERPL-MCNC: 351 UG/DL
TRIGL SERPL-MCNC: 65 MG/DL
TSH SERPL-ACNC: 2.4 UIU/ML
UIBC SERPL-MCNC: 316 UG/DL
VIT A SERPL-MCNC: 40.7 UG/DL
VIT B1 SERPL-MCNC: 110.7 NMOL/L
VIT B12 SERPL-MCNC: 1326 PG/ML
VIT B2 SERPL-MCNC: 243 UG/L
VIT B6 SERPL-MCNC: 5.1 UG/L
WBC # FLD AUTO: 5.63 K/UL

## 2021-04-21 NOTE — ASSESSMENT
[FreeTextEntry1] : 61 yo F with longstanding history of morbid obesity now 2 months s/p lap band removal (unable to proceed with sleeve gastrectomy at that time - abdominal adhesions) and scheduled for laparoscopic sleeve gastrectomy next month. Encouraged to lose weight prior to surgery. Nutrition and exercise guidelines were reviewed with the patient.  Procedure risks and benefits were again discussed with patient.  All questions were answered. Reviewed lab results - low iron sat, but ferritin normal. Hgb A1c now normal. \par \par Surgery 5/10\par Two week preoperative modified liquid diet\par PST and Medical Clearance\par Preoperative education class\par Call if any questions or concerns\par \par Additional time was spent reviewing chart before and after visit.

## 2021-04-21 NOTE — HISTORY OF PRESENT ILLNESS
[Procedure: ___] : Procedure performed: [unfilled]  [Date of Surgery: ___] : Date of Surgery:   [unfilled] [Surgeon Name:   ___] : Surgeon Name: Dr. NESS [de-identified] : 61 yo F with longstanding history of morbid obesity now 2 months s/p lap band removal (unable to proceed with sleeve gastrectomy at that time - abdominal adhesions) and scheduled for laparoscopic sleeve gastrectomy next month. Gained weight since last visit. Met with nutritionist last week. Making efforts to have three protein rich meals a day and drinks mostly water. Walking frequently for exercise. During workup, upper EGD revealed gastric ulcer and was placed on PPI. Follow up EGD found healing ulcer. No acid reflux symptoms prior to and after initiation of PPI. NO history of motion sickness and postoperative nausea and urinary retention. Repeat workup labs.  [de-identified] : LAP BAND SURGERY 10 CM , Date of Surgery: 3/21/2005, Surgeon Name: Dr. HU/ TI . Pre-op weight was 250 lbs.

## 2021-04-21 NOTE — PHYSICAL EXAM
[Obese, well nourished, in no acute distress] : obese, well nourished, in no acute distress [Normal] : affect appropriate [de-identified] : EOMI, anicteric  [de-identified] : obese, soft, nontender, no evidence of hernia. well healed incisions

## 2021-04-21 NOTE — REVIEW OF SYSTEMS
[Negative] : Psychiatric [Fever] : no fever [Chills] : no chills [Night Sweats] : no night sweats [Fatigue] : no fatigue [Recent Change In Weight] : ~T no recent weight change [Eye Pain] : no eye pain [Red Eyes] : eyes not red [Vision Problems] : no vision problems [Muscle Pain] : no muscle pain [Confused] : no confusion [Dizziness] : no dizziness [Difficulty Walking] : no difficulty walking [Fainting] : no fainting

## 2021-04-26 ENCOUNTER — OUTPATIENT (OUTPATIENT)
Dept: OUTPATIENT SERVICES | Facility: HOSPITAL | Age: 61
LOS: 1 days | End: 2021-04-26
Payer: COMMERCIAL

## 2021-04-26 VITALS
HEIGHT: 66 IN | RESPIRATION RATE: 14 BRPM | OXYGEN SATURATION: 99 % | DIASTOLIC BLOOD PRESSURE: 53 MMHG | WEIGHT: 263.89 LBS | TEMPERATURE: 98 F | SYSTOLIC BLOOD PRESSURE: 125 MMHG | HEART RATE: 80 BPM

## 2021-04-26 DIAGNOSIS — Z98.84 BARIATRIC SURGERY STATUS: Chronic | ICD-10-CM

## 2021-04-26 DIAGNOSIS — E66.01 MORBID (SEVERE) OBESITY DUE TO EXCESS CALORIES: ICD-10-CM

## 2021-04-26 DIAGNOSIS — Z98.42 CATARACT EXTRACTION STATUS, LEFT EYE: Chronic | ICD-10-CM

## 2021-04-26 DIAGNOSIS — Z98.89 OTHER SPECIFIED POSTPROCEDURAL STATES: Chronic | ICD-10-CM

## 2021-04-26 DIAGNOSIS — Z98.84 BARIATRIC SURGERY STATUS: ICD-10-CM

## 2021-04-26 DIAGNOSIS — Z96.652 PRESENCE OF LEFT ARTIFICIAL KNEE JOINT: Chronic | ICD-10-CM

## 2021-04-26 DIAGNOSIS — I10 ESSENTIAL (PRIMARY) HYPERTENSION: ICD-10-CM

## 2021-04-26 DIAGNOSIS — Z98.890 OTHER SPECIFIED POSTPROCEDURAL STATES: Chronic | ICD-10-CM

## 2021-04-26 DIAGNOSIS — Z01.818 ENCOUNTER FOR OTHER PREPROCEDURAL EXAMINATION: ICD-10-CM

## 2021-04-26 LAB
ALBUMIN SERPL ELPH-MCNC: 3.6 G/DL — SIGNIFICANT CHANGE UP (ref 3.3–5)
ALP SERPL-CCNC: 100 U/L — SIGNIFICANT CHANGE UP (ref 30–120)
ALT FLD-CCNC: 42 U/L DA — SIGNIFICANT CHANGE UP (ref 10–60)
ANION GAP SERPL CALC-SCNC: 6 MMOL/L — SIGNIFICANT CHANGE UP (ref 5–17)
AST SERPL-CCNC: 19 U/L — SIGNIFICANT CHANGE UP (ref 10–40)
BILIRUB SERPL-MCNC: 0.3 MG/DL — SIGNIFICANT CHANGE UP (ref 0.2–1.2)
BLD GP AB SCN SERPL QL: SIGNIFICANT CHANGE UP
BUN SERPL-MCNC: 14 MG/DL — SIGNIFICANT CHANGE UP (ref 7–23)
CALCIUM SERPL-MCNC: 9.7 MG/DL — SIGNIFICANT CHANGE UP (ref 8.4–10.5)
CHLORIDE SERPL-SCNC: 101 MMOL/L — SIGNIFICANT CHANGE UP (ref 96–108)
CO2 SERPL-SCNC: 32 MMOL/L — HIGH (ref 22–31)
CREAT SERPL-MCNC: 0.93 MG/DL — SIGNIFICANT CHANGE UP (ref 0.5–1.3)
GLUCOSE SERPL-MCNC: 107 MG/DL — HIGH (ref 70–99)
HCT VFR BLD CALC: 39 % — SIGNIFICANT CHANGE UP (ref 34.5–45)
HGB BLD-MCNC: 11.9 G/DL — SIGNIFICANT CHANGE UP (ref 11.5–15.5)
MCHC RBC-ENTMCNC: 25.1 PG — LOW (ref 27–34)
MCHC RBC-ENTMCNC: 30.5 GM/DL — LOW (ref 32–36)
MCV RBC AUTO: 82.1 FL — SIGNIFICANT CHANGE UP (ref 80–100)
NRBC # BLD: 0 /100 WBCS — SIGNIFICANT CHANGE UP (ref 0–0)
PLATELET # BLD AUTO: 290 K/UL — SIGNIFICANT CHANGE UP (ref 150–400)
POTASSIUM SERPL-MCNC: 3.7 MMOL/L — SIGNIFICANT CHANGE UP (ref 3.5–5.3)
POTASSIUM SERPL-SCNC: 3.7 MMOL/L — SIGNIFICANT CHANGE UP (ref 3.5–5.3)
PROT SERPL-MCNC: 8 G/DL — SIGNIFICANT CHANGE UP (ref 6–8.3)
RBC # BLD: 4.75 M/UL — SIGNIFICANT CHANGE UP (ref 3.8–5.2)
RBC # FLD: 16 % — HIGH (ref 10.3–14.5)
SODIUM SERPL-SCNC: 139 MMOL/L — SIGNIFICANT CHANGE UP (ref 135–145)
WBC # BLD: 7.76 K/UL — SIGNIFICANT CHANGE UP (ref 3.8–10.5)
WBC # FLD AUTO: 7.76 K/UL — SIGNIFICANT CHANGE UP (ref 3.8–10.5)

## 2021-04-26 PROCEDURE — 93010 ELECTROCARDIOGRAM REPORT: CPT

## 2021-04-26 PROCEDURE — 93005 ELECTROCARDIOGRAM TRACING: CPT

## 2021-04-26 RX ORDER — HYDROMORPHONE HYDROCHLORIDE 2 MG/ML
0.5 INJECTION INTRAMUSCULAR; INTRAVENOUS; SUBCUTANEOUS EVERY 4 HOURS
Refills: 0 | Status: DISCONTINUED | OUTPATIENT
Start: 2021-05-10 | End: 2021-05-11

## 2021-04-26 RX ORDER — SODIUM CHLORIDE 9 MG/ML
1000 INJECTION, SOLUTION INTRAVENOUS
Refills: 0 | Status: DISCONTINUED | OUTPATIENT
Start: 2021-05-10 | End: 2021-05-11

## 2021-04-26 RX ORDER — PANTOPRAZOLE SODIUM 20 MG/1
40 TABLET, DELAYED RELEASE ORAL DAILY
Refills: 0 | Status: DISCONTINUED | OUTPATIENT
Start: 2021-05-10 | End: 2021-05-11

## 2021-04-26 RX ORDER — OXYCODONE HYDROCHLORIDE 5 MG/1
1 TABLET ORAL
Qty: 0 | Refills: 0 | DISCHARGE

## 2021-04-26 RX ORDER — ENOXAPARIN SODIUM 100 MG/ML
40 INJECTION SUBCUTANEOUS EVERY 12 HOURS
Refills: 0 | Status: DISCONTINUED | OUTPATIENT
Start: 2021-05-10 | End: 2021-05-11

## 2021-04-26 RX ORDER — ONDANSETRON 8 MG/1
4 TABLET, FILM COATED ORAL EVERY 6 HOURS
Refills: 0 | Status: DISCONTINUED | OUTPATIENT
Start: 2021-05-10 | End: 2021-05-11

## 2021-04-26 RX ORDER — HYOSCYAMINE SULFATE 0.13 MG
0.12 TABLET ORAL EVERY 6 HOURS
Refills: 0 | Status: DISCONTINUED | OUTPATIENT
Start: 2021-05-10 | End: 2021-05-11

## 2021-04-26 NOTE — H&P PST ADULT - COMFORT LEVEL, ACCEPTABLE
Problem: Patient Care Overview (Adult)  Goal: Plan of Care Review  Outcome: Ongoing (interventions implemented as appropriate)    10/05/17 1411   Coping/Psychosocial Response Interventions   Plan Of Care Reviewed With patient   Patient Care Overview   Progress improving   Outcome Evaluation   Outcome Summary/Follow up Plan patient being discharged home             5

## 2021-04-26 NOTE — H&P PST ADULT - HISTORY OF PRESENT ILLNESS
This is 59 y/o female who had undergone lap band placement 2005 with complaint of gastric band malfunction  .she plans to have gastric band removed and undergo for sleeve gastrectomy. scheduled for surgery on 2/10/21   This is 59 y/o female presents to [facility] for pre op eval for bariatric surgery . She had lap band placement on 2005 and gastric band was removed on  2/2021 scheduled for laparoscopic sleeve gastrectomy on 5/10/21

## 2021-04-26 NOTE — H&P PST ADULT - ASSESSMENT
59 y/o female with gastric band malfunction     scheduled for revision of lap band to sleeve gastrectomy with endoscopy on 2/10/21   Will obtain medical clearance   Pre op instructions on medications   COVID testing on 2/8/21  Physical exam done on admit.  SS 59 y/o female with morbid obesity     scheduled for sleeve gastrectomy with endoscopy on 5/10/21  Will obtain medical clearance   Pre op instructions on medications   COVID testing on 5/7/21 at 10 am

## 2021-04-26 NOTE — H&P PST ADULT - NSICDXPASTSURGICALHX_GEN_ALL_CORE_FT
PAST SURGICAL HISTORY:  H/O total knee replacement, left 2015    History of removal of laparoscopic gastric banding device 2021    S/P cataract surgery, left     S/P  section     Status post gastric banding 2005     PAST SURGICAL HISTORY:  H/O total knee replacement, left 2015    History of removal of laparoscopic gastric banding device 2021    S/P cataract surgery, left     S/P  section     S/P hernia surgery 2021    Status post gastric banding 2005

## 2021-04-26 NOTE — H&P PST ADULT - NSICDXPASTMEDICALHX_GEN_ALL_CORE_FT
PAST MEDICAL HISTORY:  Anxiety     GERD (gastroesophageal reflux disease)     HTN (hypertension)     Hypercholesterolemia     Morbid obesity with BMI of 40.0-44.9, adult      PAST MEDICAL HISTORY:  Anxiety     COVID-19 vaccine series completed     GERD (gastroesophageal reflux disease)     HTN (hypertension)     Hypercholesterolemia     Morbid obesity with BMI of 40.0-44.9, adult

## 2021-05-03 ENCOUNTER — APPOINTMENT (OUTPATIENT)
Dept: INTERNAL MEDICINE | Facility: CLINIC | Age: 61
End: 2021-05-03
Payer: COMMERCIAL

## 2021-05-03 VITALS
OXYGEN SATURATION: 97 % | SYSTOLIC BLOOD PRESSURE: 126 MMHG | HEART RATE: 83 BPM | DIASTOLIC BLOOD PRESSURE: 72 MMHG | BODY MASS INDEX: 44.15 KG/M2 | TEMPERATURE: 97.9 F | RESPIRATION RATE: 14 BRPM | WEIGHT: 265 LBS | HEIGHT: 65 IN

## 2021-05-03 PROCEDURE — 99214 OFFICE O/P EST MOD 30 MIN: CPT

## 2021-05-03 PROCEDURE — 99072 ADDL SUPL MATRL&STAF TM PHE: CPT

## 2021-05-03 NOTE — HISTORY OF PRESENT ILLNESS
[No Pertinent Cardiac History] : no history of aortic stenosis, atrial fibrillation, coronary artery disease, recent myocardial infarction, or implantable device/pacemaker [Asthma] : no asthma [COPD] : no COPD [Sleep Apnea] : no sleep apnea [Smoker] : not a smoker [No Adverse Anesthesia Reaction] : no adverse anesthesia reaction in self or family member [Chronic Anticoagulation] : no chronic anticoagulation [Chronic Kidney Disease] : no chronic kidney disease [Diabetes] : no diabetes [(Patient denies any chest pain, claudication, dyspnea on exertion, orthopnea, palpitations or syncope)] : Patient denies any chest pain, claudication, dyspnea on exertion, orthopnea, palpitations or syncope [FreeTextEntry1] : sleeve gastrectomy [FreeTextEntry2] : 5/10/21 [FreeTextEntry3] : Dr Azevedo [FreeTextEntry4] : Pt is scheduled for gastric sleeve surgery. \par Has chronic back pain from her large breasts pulling on back. Especially her upper back. She wears a good support bra. Uses a heating pad.

## 2021-05-07 ENCOUNTER — OUTPATIENT (OUTPATIENT)
Dept: OUTPATIENT SERVICES | Facility: HOSPITAL | Age: 61
LOS: 1 days | End: 2021-05-07
Payer: COMMERCIAL

## 2021-05-07 DIAGNOSIS — Z98.89 OTHER SPECIFIED POSTPROCEDURAL STATES: Chronic | ICD-10-CM

## 2021-05-07 DIAGNOSIS — Z20.828 CONTACT WITH AND (SUSPECTED) EXPOSURE TO OTHER VIRAL COMMUNICABLE DISEASES: ICD-10-CM

## 2021-05-07 DIAGNOSIS — Z98.890 OTHER SPECIFIED POSTPROCEDURAL STATES: Chronic | ICD-10-CM

## 2021-05-07 DIAGNOSIS — Z98.84 BARIATRIC SURGERY STATUS: Chronic | ICD-10-CM

## 2021-05-07 DIAGNOSIS — Z98.42 CATARACT EXTRACTION STATUS, LEFT EYE: Chronic | ICD-10-CM

## 2021-05-07 DIAGNOSIS — Z96.652 PRESENCE OF LEFT ARTIFICIAL KNEE JOINT: Chronic | ICD-10-CM

## 2021-05-07 LAB — SARS-COV-2 RNA SPEC QL NAA+PROBE: SIGNIFICANT CHANGE UP

## 2021-05-07 PROCEDURE — U0003: CPT

## 2021-05-07 PROCEDURE — U0005: CPT

## 2021-05-09 ENCOUNTER — TRANSCRIPTION ENCOUNTER (OUTPATIENT)
Age: 61
End: 2021-05-09

## 2021-05-10 ENCOUNTER — RESULT REVIEW (OUTPATIENT)
Age: 61
End: 2021-05-10

## 2021-05-10 ENCOUNTER — APPOINTMENT (OUTPATIENT)
Dept: BARIATRICS | Facility: HOSPITAL | Age: 61
End: 2021-05-10
Payer: COMMERCIAL

## 2021-05-10 ENCOUNTER — TRANSCRIPTION ENCOUNTER (OUTPATIENT)
Age: 61
End: 2021-05-10

## 2021-05-10 ENCOUNTER — INPATIENT (INPATIENT)
Facility: HOSPITAL | Age: 61
LOS: 0 days | Discharge: HOME | DRG: 621 | End: 2021-05-11
Attending: SURGERY | Admitting: SURGERY
Payer: COMMERCIAL

## 2021-05-10 VITALS
SYSTOLIC BLOOD PRESSURE: 127 MMHG | TEMPERATURE: 99 F | HEART RATE: 92 BPM | WEIGHT: 258.82 LBS | OXYGEN SATURATION: 100 % | DIASTOLIC BLOOD PRESSURE: 76 MMHG | RESPIRATION RATE: 22 BRPM | HEIGHT: 66 IN

## 2021-05-10 DIAGNOSIS — Z98.890 OTHER SPECIFIED POSTPROCEDURAL STATES: Chronic | ICD-10-CM

## 2021-05-10 DIAGNOSIS — E66.01 MORBID (SEVERE) OBESITY DUE TO EXCESS CALORIES: ICD-10-CM

## 2021-05-10 DIAGNOSIS — Z98.84 BARIATRIC SURGERY STATUS: Chronic | ICD-10-CM

## 2021-05-10 DIAGNOSIS — Z98.89 OTHER SPECIFIED POSTPROCEDURAL STATES: Chronic | ICD-10-CM

## 2021-05-10 DIAGNOSIS — Z98.84 BARIATRIC SURGERY STATUS: ICD-10-CM

## 2021-05-10 DIAGNOSIS — Z96.652 PRESENCE OF LEFT ARTIFICIAL KNEE JOINT: Chronic | ICD-10-CM

## 2021-05-10 DIAGNOSIS — Z98.42 CATARACT EXTRACTION STATUS, LEFT EYE: Chronic | ICD-10-CM

## 2021-05-10 PROCEDURE — 99223 1ST HOSP IP/OBS HIGH 75: CPT

## 2021-05-10 PROCEDURE — 43775 LAP SLEEVE GASTRECTOMY: CPT | Mod: AS,22

## 2021-05-10 PROCEDURE — 43775 LAP SLEEVE GASTRECTOMY: CPT | Mod: 22,58

## 2021-05-10 PROCEDURE — 88307 TISSUE EXAM BY PATHOLOGIST: CPT | Mod: 26

## 2021-05-10 RX ORDER — ONDANSETRON 8 MG/1
4 TABLET, FILM COATED ORAL ONCE
Refills: 0 | Status: DISCONTINUED | OUTPATIENT
Start: 2021-05-10 | End: 2021-05-10

## 2021-05-10 RX ORDER — IBUPROFEN 200 MG
800 TABLET ORAL EVERY 6 HOURS
Refills: 0 | Status: DISCONTINUED | OUTPATIENT
Start: 2021-05-10 | End: 2021-05-11

## 2021-05-10 RX ORDER — ACETAMINOPHEN 500 MG
1000 TABLET ORAL EVERY 6 HOURS
Refills: 0 | Status: DISCONTINUED | OUTPATIENT
Start: 2021-05-11 | End: 2021-05-11

## 2021-05-10 RX ORDER — HYDROMORPHONE HYDROCHLORIDE 2 MG/ML
0.5 INJECTION INTRAMUSCULAR; INTRAVENOUS; SUBCUTANEOUS ONCE
Refills: 0 | Status: DISCONTINUED | OUTPATIENT
Start: 2021-05-10 | End: 2021-05-10

## 2021-05-10 RX ORDER — SODIUM CHLORIDE 9 MG/ML
1000 INJECTION, SOLUTION INTRAVENOUS
Refills: 0 | Status: DISCONTINUED | OUTPATIENT
Start: 2021-05-10 | End: 2021-05-10

## 2021-05-10 RX ORDER — OMEPRAZOLE 10 MG/1
1 CAPSULE, DELAYED RELEASE ORAL
Qty: 0 | Refills: 0 | DISCHARGE

## 2021-05-10 RX ORDER — ACETAMINOPHEN 500 MG
2 TABLET ORAL
Qty: 0 | Refills: 0 | DISCHARGE

## 2021-05-10 RX ORDER — CEFAZOLIN SODIUM 1 G
2000 VIAL (EA) INJECTION ONCE
Refills: 0 | Status: COMPLETED | OUTPATIENT
Start: 2021-05-10 | End: 2021-05-10

## 2021-05-10 RX ORDER — ACETAMINOPHEN 500 MG
1000 TABLET ORAL ONCE
Refills: 0 | Status: COMPLETED | OUTPATIENT
Start: 2021-05-10 | End: 2021-05-10

## 2021-05-10 RX ORDER — ENOXAPARIN SODIUM 100 MG/ML
40 INJECTION SUBCUTANEOUS ONCE
Refills: 0 | Status: COMPLETED | OUTPATIENT
Start: 2021-05-10 | End: 2021-05-10

## 2021-05-10 RX ORDER — HYDROMORPHONE HYDROCHLORIDE 2 MG/ML
0.5 INJECTION INTRAMUSCULAR; INTRAVENOUS; SUBCUTANEOUS
Refills: 0 | Status: DISCONTINUED | OUTPATIENT
Start: 2021-05-10 | End: 2021-05-10

## 2021-05-10 RX ORDER — ACETAMINOPHEN 500 MG
1000 TABLET ORAL EVERY 6 HOURS
Refills: 0 | Status: COMPLETED | OUTPATIENT
Start: 2021-05-10 | End: 2021-05-11

## 2021-05-10 RX ORDER — CHLORHEXIDINE GLUCONATE 213 G/1000ML
1 SOLUTION TOPICAL ONCE
Refills: 0 | Status: COMPLETED | OUTPATIENT
Start: 2021-05-10 | End: 2021-05-10

## 2021-05-10 RX ORDER — SODIUM CHLORIDE 9 MG/ML
2000 INJECTION, SOLUTION INTRAVENOUS
Refills: 0 | Status: DISCONTINUED | OUTPATIENT
Start: 2021-05-10 | End: 2021-05-10

## 2021-05-10 RX ORDER — APREPITANT 80 MG/1
40 CAPSULE ORAL ONCE
Refills: 0 | Status: COMPLETED | OUTPATIENT
Start: 2021-05-10 | End: 2021-05-10

## 2021-05-10 RX ADMIN — Medication 400 MILLIGRAM(S): at 17:31

## 2021-05-10 RX ADMIN — ENOXAPARIN SODIUM 40 MILLIGRAM(S): 100 INJECTION SUBCUTANEOUS at 08:27

## 2021-05-10 RX ADMIN — SODIUM CHLORIDE 1000 MILLILITER(S): 9 INJECTION, SOLUTION INTRAVENOUS at 08:27

## 2021-05-10 RX ADMIN — HYDROMORPHONE HYDROCHLORIDE 0.5 MILLIGRAM(S): 2 INJECTION INTRAMUSCULAR; INTRAVENOUS; SUBCUTANEOUS at 22:05

## 2021-05-10 RX ADMIN — SODIUM CHLORIDE 75 MILLILITER(S): 9 INJECTION, SOLUTION INTRAVENOUS at 14:23

## 2021-05-10 RX ADMIN — CHLORHEXIDINE GLUCONATE 1 APPLICATION(S): 213 SOLUTION TOPICAL at 08:27

## 2021-05-10 RX ADMIN — SODIUM CHLORIDE 150 MILLILITER(S): 9 INJECTION, SOLUTION INTRAVENOUS at 16:21

## 2021-05-10 RX ADMIN — HYDROMORPHONE HYDROCHLORIDE 0.5 MILLIGRAM(S): 2 INJECTION INTRAMUSCULAR; INTRAVENOUS; SUBCUTANEOUS at 18:45

## 2021-05-10 RX ADMIN — Medication 1000 MILLIGRAM(S): at 18:00

## 2021-05-10 RX ADMIN — Medication 1.25 MILLIGRAM(S): at 14:36

## 2021-05-10 RX ADMIN — ENOXAPARIN SODIUM 40 MILLIGRAM(S): 100 INJECTION SUBCUTANEOUS at 20:39

## 2021-05-10 RX ADMIN — ONDANSETRON 4 MILLIGRAM(S): 8 TABLET, FILM COATED ORAL at 17:30

## 2021-05-10 RX ADMIN — HYDROMORPHONE HYDROCHLORIDE 0.5 MILLIGRAM(S): 2 INJECTION INTRAMUSCULAR; INTRAVENOUS; SUBCUTANEOUS at 21:35

## 2021-05-10 RX ADMIN — HYDROMORPHONE HYDROCHLORIDE 0.5 MILLIGRAM(S): 2 INJECTION INTRAMUSCULAR; INTRAVENOUS; SUBCUTANEOUS at 18:27

## 2021-05-10 RX ADMIN — PANTOPRAZOLE SODIUM 40 MILLIGRAM(S): 20 TABLET, DELAYED RELEASE ORAL at 17:31

## 2021-05-10 RX ADMIN — APREPITANT 40 MILLIGRAM(S): 80 CAPSULE ORAL at 08:27

## 2021-05-10 NOTE — DISCHARGE NOTE PROVIDER - REASON FOR ADMISSION
59 yo  F with PMHx of morbid obesity admitted to [facility] for scheduled laparoscopic sleeve gastrectomy and intra-operative EGD.

## 2021-05-10 NOTE — DISCHARGE NOTE PROVIDER - NSDCMRMEDTOKEN_GEN_ALL_CORE_FT
acetaminophen 500 mg/15 mL oral liquid: 15 milliliter(s) orally every 6 hours for mild pain not to exceed 5 days   rosuvastatin 5 mg oral capsule: 1 cap(s) orally once a day  sertraline 100 mg oral tablet: 1 tab(s) orally once a day in am  sertraline 50 mg oral tablet: 1 tab(s) orally once a day in pm   valsartan-hydrochlorothiazide 80mg-12.5mg oral tablet: 1 tab(s) orally once a day   acetaminophen 500 mg/15 mL oral liquid: 15 milliliter(s) orally every 6 hours for mild pain not to exceed 5 days   omeprazole 20 mg oral delayed release capsule: 1 cap(s) orally once a day open and put in low fat yogurt or pudding.   ondansetron 4 mg oral tablet, disintegratin tab(s) orally 3 times a day as needed for nausea   oxyCODONE 5 mg oral tablet: 1 tab(s) orally every 6 hours as needed for moderate to severe pain crush and put in low fat yogurt or pudding.   rosuvastatin 5 mg oral capsule: 1 cap(s) orally once a day  sertraline 100 mg oral tablet: 1 tab(s) orally once a day in am  sertraline 50 mg oral tablet: 1 tab(s) orally once a day in pm   valsartan 80 mg oral tablet: 1 tab(s) orally once a day

## 2021-05-10 NOTE — DISCHARGE NOTE PROVIDER - HOSPITAL COURSE
59 yo  F with PMHx of morbid obesity admitted to [facility] for scheduled laparoscopic sleeve gastrectomy and intra-operative EGD. Past Medical History of Lap Band 10 CM- 3/2/2005. Lap Band was removed on 2/10/2021- unable to perform revision at time. Post operatively patient did well, good urine output and ambulating well on floor. Pt advanced to bariatric clears which she tolerated . Nutritional guidelines were reviewed with the nutritionist. Patient felt ready for discharge to home. Pt instructed to drink small frequent amounts, start protein drinks and follow dietary guidelines. Instructed to ambulate and use incentive spirometry frequently. Pt to follow up with Dr. Azevedo in 1 week and call with any questions or concerns. 61 yo  F with PMHx of morbid obesity admitted to [facility] for scheduled laparoscopic sleeve gastrectomy and intra-operative EGD. Past Medical History of Lap Band 10 CM- 3/2/2005. Lap Band was removed on 2/10/2021 ( scheduled single stage revision) - unable to perform revision at time secondary to extensive adhesions. Post operatively patient did well, good urine output and ambulating well on floor. Pt advanced to bariatric clears which she tolerated . Nutritional guidelines were reviewed with the nutritionist. Patient felt ready for discharge to home. Pt instructed to drink small frequent amounts, start protein drinks and follow dietary guidelines. Instructed to ambulate and use incentive spirometry frequently. Pt to follow up with Dr. Azevedo in 1 week and call with any questions or concerns.

## 2021-05-10 NOTE — DISCHARGE NOTE PROVIDER - CARE PROVIDER_API CALL
Bebe Azevedo (MD)  Surgery  [address] [place] [address]  Phone: [phone]  Fax: [phone]  Follow Up Time:

## 2021-05-10 NOTE — DISCHARGE NOTE PROVIDER - NSDCFUSCHEDAPPT_GEN_ALL_CORE_FT
STEPH SAENZ ; 05/18/2021 ; NPP Surg Bariatric [address] ; 06/10/2021 ; NPP Surg Bariatric [address] ; 07/06/2021 ; N[facility] [address]

## 2021-05-10 NOTE — DISCHARGE NOTE PROVIDER - NSDCCPTREATMENT_GEN_ALL_CORE_FT
PRINCIPAL PROCEDURE  Procedure: Gastrectomy, sleeve, laparoscopic, with intraoperative EGD  Findings and Treatment: Tolerated procedure well

## 2021-05-10 NOTE — DISCHARGE NOTE PROVIDER - NSDCCPCAREPLAN_GEN_ALL_CORE_FT
PRINCIPAL DISCHARGE DIAGNOSIS  Diagnosis: Morbid obesity  Assessment and Plan of Treatment: Instructed to ambulate and use incentive spirometry frequently. Ice packs to abdominal wall and shoulders as needed for discomfort. Cont bariatric Continue Bariatric Clear Diet . Plan to start Protein shakes at home . Avoid long heat exposure -outdoors. and follow nutritional guidelines as instructed. Pain meds as needed by MD. Pt instructed  to follow up with Dr. Azevedo  in 1 week.      SECONDARY DISCHARGE DIAGNOSES  Diagnosis: S/P bariatric surgery  Assessment and Plan of Treatment: Instructed to ambulate and use incentive spirometry frequently. Ice packs to abdominal wall and shoulders as needed for discomfort. Cont bariatric Continue Bariatric Clear Diet . Plan to start Protein shakes at home . Avoid long heat exposure -outdoors. and follow nutritional guidelines as instructed. Pain meds as needed by MD. Pt instructed  to follow up with Dr. Azevedo  in 1 week.

## 2021-05-10 NOTE — BRIEF OPERATIVE NOTE - NSICDXBRIEFPROCEDURE_GEN_ALL_CORE_FT
PROCEDURES:  Laparoscopic sleeve gastrectomy with laparoscopic repair of hiatal hernia 10-May-2021 14:15:15  Alexsandra Cope  Laparoscopic lysis of abdominal adhesions 10-May-2021 14:15:45 greater than 45 minutes Alexsandra Cope  EGD 10-May-2021 14:16:19 x2 Alexsandra Cope

## 2021-05-10 NOTE — CONSULT NOTE ADULT - ASSESSMENT
60F HTN, HLD, GERD, Anxiety, admitted for aftercare following Gatric Sleeve Surgery.     S/P Gastric Sleep POD 0  Continue Bowel and pain control regimen.   Incentive Spirometer for lung expansion.  Work with PT to increase ambulation as per orthopedics.  Monitor Hgb and follow up electrolytes.   Orthopedics on board and following     HTN  Hold oral meds for now  IV Vasotec PRN    HLD  Holding meds for now    GERD  IV Protonix    Anxitey  Ativan PRN    Diet  As per surgery    Diet  Regular    DVT Prophylaxis  Lovenox    Disposition  Full Code/Inpatient  Discharge planning pending hospital course

## 2021-05-10 NOTE — CONSULT NOTE ADULT - SUBJECTIVE AND OBJECTIVE BOX
HPI: 60F HTN, HLD, GERD, Anxiety,   has been attempting weight loss for some time and has been unsuccessful.  She has decided to and has undergone a gastric sleeve procedure today.  She recently had a gastric band removed 2021.  She is currently resting in bed comfortable with good pain control.     REVIEW OF SYSTEMS:  CONSTITUTIONAL: No fever, weight loss, or fatigue  EYES: No eye pain, visual disturbances, or discharge  ENMT:  No difficulty hearing, tinnitus, vertigo; No sinus or throat pain  NECK: No pain or stiffness  RESPIRATORY: No cough, wheezing, chills or hemoptysis; No shortness of breath  CARDIOVASCULAR: No chest pain, palpitations, dizziness, or leg swelling  GASTROINTESTINAL: No abdominal or epigastric pain. No nausea, vomiting, or hematemesis; No diarrhea or constipation. No melena or hematochezia.  GENITOURINARY: No dysuria, frequency, hematuria, or incontinence  NEUROLOGICAL: No headaches, memory loss, loss of strength, numbness, or tremors  MUSCULOSKELETAL: No muscle or back pain      PAST MEDICAL & SURGICAL HISTORY:  HTN (hypertension)    Hypercholesterolemia    GERD (gastroesophageal reflux disease)    Anxiety    Morbid obesity with BMI of 40.0-44.9, adult    COVID-19 vaccine series completed    S/P  section      S/P cataract surgery, left  2016    Status post gastric banding      H/O total knee replacement, left      History of removal of laparoscopic gastric banding device  2021    S/P hernia surgery  2021        SOCIAL HISTORY:  Tobacco; EtOH; Illicit Drugs    Allergies    No Known Allergies    Intolerances        MEDICATIONS  (STANDING):  acetaminophen  IVPB .. 1000 milliGRAM(s) IV Intermittent every 6 hours  enalaprilat Injectable 1.25 milliGRAM(s) IV Push every 6 hours  enoxaparin Injectable 40 milliGRAM(s) SubCutaneous every 12 hours  lactated ringers. 1000 milliLiter(s) (150 mL/Hr) IV Continuous <Continuous>  ondansetron Injectable 4 milliGRAM(s) IV Push every 6 hours  pantoprazole  Injectable 40 milliGRAM(s) IV Push daily    MEDICATIONS  (PRN):  HYDROmorphone  Injectable 0.5 milliGRAM(s) IV Push every 4 hours PRN Severe Pain (7 - 10)  hyoscyamine SL 0.125 milliGRAM(s) SubLingual every 6 hours PRN Nausea and/or vomiting  ibuprofen IVPB .. 800 milliGRAM(s) IV Intermittent every 6 hours PRN Moderate Pain (4 - 6)      FAMILY HISTORY:  Family history of cardiac pacemaker  father    FH: type 2 diabetes  mother        Vital Signs Last 24 Hrs  T(C): 36.9 (10 May 2021 14:00), Max: 37.2 (10 May 2021 08:28)  T(F): 98.4 (10 May 2021 14:00), Max: 98.9 (10 May 2021 08:28)  HR: 90 (10 May 2021 15:50) (88 - 98)  BP: 148/76 (10 May 2021 15:50) (127/76 - 152/80)  BP(mean): --  RR: 14 (10 May 2021 15:50) (12 - 32)  SpO2: 97% (10 May 2021 15:50) (97% - 100%)    PHYSICAL EXAM:    GENERAL: NAD, well-developed  HEAD:  Atraumatic, Normocephalic  EYES: EOMI, PERRLA, conjunctiva and sclera clear  ENMT: No tonsillar erythema, exudates, or enlargement; Moist mucous membranes, Good dentition, No lesions  NECK: Supple, No JVD, Normal thyroid  NERVOUS SYSTEM:  Alert & Oriented X3, Good concentration;  CHEST/LUNG: Clear to auscultation bilaterally; No rales, rhonchi, wheezing, or rubs  HEART: Regular rate and rhythm; No murmurs, rubs, or gallops  ABDOMEN: Soft, Nontender, Nondistended; Bowel sounds present  EXTREMITIES:  2+ Peripheral Pulses, No clubbing, cyanosis, or edema      LABS:              CAPILLARY BLOOD GLUCOSE          RADIOLOGY & ADDITIONAL STUDIES:    EKG:   Personally Reviewed:  [ ] YES     Imaging:   Personally Reviewed:  [ ] YES     Consultant(s) Notes Reviewed:      Care Discussed with Consultants/Other Providers:

## 2021-05-11 ENCOUNTER — TRANSCRIPTION ENCOUNTER (OUTPATIENT)
Age: 61
End: 2021-05-11

## 2021-05-11 VITALS
RESPIRATION RATE: 18 BRPM | DIASTOLIC BLOOD PRESSURE: 79 MMHG | SYSTOLIC BLOOD PRESSURE: 131 MMHG | HEART RATE: 68 BPM | TEMPERATURE: 98 F

## 2021-05-11 DIAGNOSIS — E66.01 MORBID (SEVERE) OBESITY DUE TO EXCESS CALORIES: ICD-10-CM

## 2021-05-11 DIAGNOSIS — Z98.84 BARIATRIC SURGERY STATUS: ICD-10-CM

## 2021-05-11 DIAGNOSIS — K44.9 DIAPHRAGMATIC HERNIA WITHOUT OBSTRUCTION OR GANGRENE: ICD-10-CM

## 2021-05-11 LAB
ANION GAP SERPL CALC-SCNC: 6 MMOL/L — SIGNIFICANT CHANGE UP (ref 5–17)
BUN SERPL-MCNC: 11 MG/DL — SIGNIFICANT CHANGE UP (ref 7–23)
CALCIUM SERPL-MCNC: 8.3 MG/DL — LOW (ref 8.4–10.5)
CHLORIDE SERPL-SCNC: 105 MMOL/L — SIGNIFICANT CHANGE UP (ref 96–108)
CO2 SERPL-SCNC: 29 MMOL/L — SIGNIFICANT CHANGE UP (ref 22–31)
CREAT SERPL-MCNC: 0.98 MG/DL — SIGNIFICANT CHANGE UP (ref 0.5–1.3)
FERRITIN SERPL-MCNC: 70 NG/ML — SIGNIFICANT CHANGE UP (ref 15–150)
FOLATE SERPL-MCNC: 14.2 NG/ML — SIGNIFICANT CHANGE UP
GLUCOSE SERPL-MCNC: 90 MG/DL — SIGNIFICANT CHANGE UP (ref 70–99)
HCT VFR BLD CALC: 31.4 % — LOW (ref 34.5–45)
HGB BLD-MCNC: 9.8 G/DL — LOW (ref 11.5–15.5)
IRON SATN MFR SERPL: 18 UG/DL — LOW (ref 30–160)
IRON SATN MFR SERPL: 8 % — LOW (ref 14–50)
MCHC RBC-ENTMCNC: 25.5 PG — LOW (ref 27–34)
MCHC RBC-ENTMCNC: 31.2 GM/DL — LOW (ref 32–36)
MCV RBC AUTO: 81.6 FL — SIGNIFICANT CHANGE UP (ref 80–100)
NRBC # BLD: 0 /100 WBCS — SIGNIFICANT CHANGE UP (ref 0–0)
PLATELET # BLD AUTO: 261 K/UL — SIGNIFICANT CHANGE UP (ref 150–400)
POTASSIUM SERPL-MCNC: 4 MMOL/L — SIGNIFICANT CHANGE UP (ref 3.5–5.3)
POTASSIUM SERPL-SCNC: 4 MMOL/L — SIGNIFICANT CHANGE UP (ref 3.5–5.3)
RBC # BLD: 3.85 M/UL — SIGNIFICANT CHANGE UP (ref 3.8–5.2)
RBC # FLD: 16 % — HIGH (ref 10.3–14.5)
SODIUM SERPL-SCNC: 140 MMOL/L — SIGNIFICANT CHANGE UP (ref 135–145)
TIBC SERPL-MCNC: 223 UG/DL — SIGNIFICANT CHANGE UP (ref 220–430)
UIBC SERPL-MCNC: 205 UG/DL — SIGNIFICANT CHANGE UP (ref 110–370)
VIT B12 SERPL-MCNC: 1770 PG/ML — HIGH (ref 232–1245)
WBC # BLD: 9.29 K/UL — SIGNIFICANT CHANGE UP (ref 3.8–10.5)
WBC # FLD AUTO: 9.29 K/UL — SIGNIFICANT CHANGE UP (ref 3.8–10.5)

## 2021-05-11 PROCEDURE — 85027 COMPLETE CBC AUTOMATED: CPT

## 2021-05-11 PROCEDURE — 80048 BASIC METABOLIC PNL TOTAL CA: CPT

## 2021-05-11 PROCEDURE — 82607 VITAMIN B-12: CPT

## 2021-05-11 PROCEDURE — 83540 ASSAY OF IRON: CPT

## 2021-05-11 PROCEDURE — 99233 SBSQ HOSP IP/OBS HIGH 50: CPT

## 2021-05-11 PROCEDURE — 83550 IRON BINDING TEST: CPT

## 2021-05-11 PROCEDURE — C1889: CPT

## 2021-05-11 PROCEDURE — 82728 ASSAY OF FERRITIN: CPT

## 2021-05-11 PROCEDURE — 94664 DEMO&/EVAL PT USE INHALER: CPT

## 2021-05-11 PROCEDURE — 88307 TISSUE EXAM BY PATHOLOGIST: CPT

## 2021-05-11 PROCEDURE — 36415 COLL VENOUS BLD VENIPUNCTURE: CPT

## 2021-05-11 PROCEDURE — 82746 ASSAY OF FOLIC ACID SERUM: CPT

## 2021-05-11 RX ORDER — OXYCODONE HYDROCHLORIDE 5 MG/1
5 TABLET ORAL EVERY 6 HOURS
Refills: 0 | Status: DISCONTINUED | OUTPATIENT
Start: 2021-05-11 | End: 2021-05-11

## 2021-05-11 RX ADMIN — Medication 1.25 MILLIGRAM(S): at 12:42

## 2021-05-11 RX ADMIN — ENOXAPARIN SODIUM 40 MILLIGRAM(S): 100 INJECTION SUBCUTANEOUS at 09:02

## 2021-05-11 RX ADMIN — Medication 1000 MILLIGRAM(S): at 01:11

## 2021-05-11 RX ADMIN — Medication 1000 MILLIGRAM(S): at 05:43

## 2021-05-11 RX ADMIN — OXYCODONE HYDROCHLORIDE 5 MILLIGRAM(S): 5 TABLET ORAL at 11:59

## 2021-05-11 RX ADMIN — Medication 800 MILLIGRAM(S): at 10:10

## 2021-05-11 RX ADMIN — Medication 800 MILLIGRAM(S): at 03:52

## 2021-05-11 RX ADMIN — HYDROMORPHONE HYDROCHLORIDE 0.5 MILLIGRAM(S): 2 INJECTION INTRAMUSCULAR; INTRAVENOUS; SUBCUTANEOUS at 05:43

## 2021-05-11 RX ADMIN — Medication 1.25 MILLIGRAM(S): at 17:48

## 2021-05-11 RX ADMIN — HYDROMORPHONE HYDROCHLORIDE 0.5 MILLIGRAM(S): 2 INJECTION INTRAMUSCULAR; INTRAVENOUS; SUBCUTANEOUS at 06:13

## 2021-05-11 RX ADMIN — ONDANSETRON 4 MILLIGRAM(S): 8 TABLET, FILM COATED ORAL at 00:28

## 2021-05-11 RX ADMIN — Medication 400 MILLIGRAM(S): at 05:43

## 2021-05-11 RX ADMIN — Medication 400 MILLIGRAM(S): at 00:28

## 2021-05-11 RX ADMIN — PANTOPRAZOLE SODIUM 40 MILLIGRAM(S): 20 TABLET, DELAYED RELEASE ORAL at 12:20

## 2021-05-11 RX ADMIN — ONDANSETRON 4 MILLIGRAM(S): 8 TABLET, FILM COATED ORAL at 12:19

## 2021-05-11 RX ADMIN — ONDANSETRON 4 MILLIGRAM(S): 8 TABLET, FILM COATED ORAL at 17:48

## 2021-05-11 RX ADMIN — Medication 516 MILLIGRAM(S): at 09:40

## 2021-05-11 RX ADMIN — Medication 516 MILLIGRAM(S): at 03:22

## 2021-05-11 RX ADMIN — ONDANSETRON 4 MILLIGRAM(S): 8 TABLET, FILM COATED ORAL at 05:43

## 2021-05-11 RX ADMIN — OXYCODONE HYDROCHLORIDE 5 MILLIGRAM(S): 5 TABLET ORAL at 12:29

## 2021-05-11 NOTE — PROGRESS NOTE ADULT - ASSESSMENT
60F HTN, HLD, GERD, Anxiety, admitted for aftercare following Gatric Sleeve Surgery.     S/P Gastric Sleep POD 1  Continue Bowel and pain control regimen.   Incentive Spirometer for lung expansion.  Work with PT to increase ambulation as per orthopedics.  Monitor Hgb and follow up electrolytes.   Orthopedics on board and following     Anemia  Seems to be at baseline; Normocytic   Will get Iron studies, B12 and Folate  No transfusion at this point     HTN  Hold oral meds for now  IV Vasotec PRN    HLD  Holding meds for now    GERD / Hiatal Hernia  IV Protonix    Anxitey  Ativan PRN    Diet  As per surgery    Diet  Regular    DVT Prophylaxis  Lovenox    Disposition  Full Code/Inpatient  Discharge planning pending hospital course 
61 yo F with history of lap band and lap band removal now POD #1 s/p Laparoscopic sleeve gastrectomy with laparoscopic repair of hiatal hernia, laparoscopic lysis of abdominal adhesions and intraoperative EGD is doing well and hemodynamically stable. Monitor UO.

## 2021-05-11 NOTE — PROGRESS NOTE ADULT - PROBLEM SELECTOR PLAN 1
Cont GI / DVT prophylaxis.   Cont  OOB / ambulation on floor / incentive spirometry.  Cont bariatric clear diet as tolerated .  Dietician consult.   Discussed and reviewed home meds  and pain medication with patient. Discussed medication that requires crushing.  Plan to begin protein shakes at home.  Possibly discharged later today or tomorrow.

## 2021-05-11 NOTE — PHARMACOTHERAPY INTERVENTION NOTE - COMMENTS
post-op bariatric patient on discharge medications: Oxycodone / Acetaminophen for pain management, Ondansetron for prevention of nausea and vomiting, Omeprazole for prevention of gastritis, Sertraline for mood, Rosuvastatin for hyperlipidemia and Valsartan for management of hypertension.  Educate patient on what medications are use for, how to take medications, major side effects and how to manage them and medications to avoid while recovering from surgery.

## 2021-05-11 NOTE — PROGRESS NOTE ADULT - SUBJECTIVE AND OBJECTIVE BOX
no significant events overnight reported   scant flatus   vss abd soft, mild tender          cv s1s2          chest air entry  labs reviewed    a/p- 61 yo F with history of lap band and lap band removal now s/p Laparoscopic sleeve gastrectomy with laparoscopic repair of hiatal hernia, laparoscopic lysis of abdominal adhesions and intraoperative EGD POD # 1.     advance diet per surgery   acid suppression, zofran prn    ambulation encouraged. 
Subjective: With minimal discomfort in abdomen.  No overnight events.     MEDICATIONS  (STANDING):  enalaprilat Injectable 1.25 milliGRAM(s) IV Push every 6 hours  enoxaparin Injectable 40 milliGRAM(s) SubCutaneous every 12 hours  lactated ringers. 1000 milliLiter(s) (150 mL/Hr) IV Continuous <Continuous>  ondansetron Injectable 4 milliGRAM(s) IV Push every 6 hours  pantoprazole  Injectable 40 milliGRAM(s) IV Push daily    MEDICATIONS  (PRN):  acetaminophen  IVPB .. 1000 milliGRAM(s) IV Intermittent every 6 hours PRN Mild Pain (1 - 3)  HYDROmorphone  Injectable 0.5 milliGRAM(s) IV Push every 4 hours PRN Severe Pain (7 - 10)  hyoscyamine SL 0.125 milliGRAM(s) SubLingual every 6 hours PRN Nausea and/or vomiting  ibuprofen IVPB .. 800 milliGRAM(s) IV Intermittent every 6 hours PRN Moderate Pain (4 - 6)      Allergies    No Known Allergies    Intolerances        Vital Signs Last 24 Hrs  T(C): 36.9 (11 May 2021 08:12), Max: 36.9 (10 May 2021 14:00)  T(F): 98.4 (11 May 2021 08:12), Max: 98.4 (10 May 2021 14:00)  HR: 74 (11 May 2021 08:12) (74 - 98)  BP: 130/71 (11 May 2021 08:12) (108/69 - 152/80)  BP(mean): --  RR: 17 (11 May 2021 08:12) (12 - 32)  SpO2: 98% (11 May 2021 08:12) (95% - 100%)    PHYSICAL EXAM:  GENERAL: NAD, well-groomed, well-developed  HEAD:  Atraumatic, Normocephalic  ENMT: Moist mucous membranes,   NECK: Supple, No JVD, Normal thyroid  NERVOUS SYSTEM:  All 4 extremities mobile, no gross sensory deficits.   CHEST/LUNG: Clear to auscultation bilaterally; No rales, rhonchi, wheezing, or rubs  HEART: Regular rate and rhythm; No murmurs, rubs, or gallops  ABDOMEN: Soft, Nontender, Nondistended; Bowel sounds present  EXTREMITIES:  2+ Peripheral Pulses, No clubbing, cyanosis, or edema      LABS:                        9.8    9.29  )-----------( 261      ( 11 May 2021 07:06 )             31.4     11 May 2021 07:06    140    |  105    |  11     ----------------------------<  90     4.0     |  29     |  0.98     Ca    8.3        11 May 2021 07:06          CAPILLARY BLOOD GLUCOSE          RADIOLOGY & ADDITIONAL TESTS:    Imaging Personally Reviewed:  [ ] YES     Consultant(s) Notes Reviewed:      Care Discussed with Consultants/Other Providers:    Advanced Directives: [ ] DNR  [ ] No feeding tube  [ ] MOLST in chart  [ ] MOLST completed today  [ ] Unknown  
Pre-Op Dx: Morbid obesity  Procedure: Laparoscopic sleeve gastrectomy with laparoscopic repair of hiatal hernia, Laparoscopic lysis of abdominal adhesions, EGD  Surgeon: Jolly    HPI:   61 yo F with history of lap band and lap band removal now s/p Laparoscopic sleeve gastrectomy with laparoscopic repair of hiatal hernia, laparoscopic lysis of abdominal adhesions and intraoperative EGD POD # 1.  Ambulating on the floor and utilizing incentive spirometry. Tolerating clear liquid diet. Matute catheter removed this am and no spontaneous voiding yet. Minimal incisional discomfort. Denies any nausea or vomiting. Pt seen and examined at the bedside.     VITALS:  Vital Signs Last 24 Hrs  T(C): 36.9 (11 May 2021 08:12), Max: 36.9 (10 May 2021 14:00)  T(F): 98.4 (11 May 2021 08:12), Max: 98.4 (10 May 2021 14:00)  HR: 74 (11 May 2021 08:12) (74 - 98)  BP: 130/71 (11 May 2021 08:12) (108/69 - 152/80)  BP(mean): --  RR: 17 (11 May 2021 08:12) (12 - 32)  SpO2: 98% (11 May 2021 08:12) (95% - 100%)    05-10 @ 07:01  -  05-11 @ 07:00  --------------------------------------------------------  IN: 5850 mL / OUT: 700 mL / NET: 5150 mL        LABS:                        9.8    9.29  )-----------( 261      ( 11 May 2021 07:06 )             31.4     05-11    140  |  105  |  11  ----------------------------<  90  4.0   |  29  |  0.98    Ca    8.3<L>      11 May 2021 07:06        Physical Exam:  General: A/O x 3, NAD, sitting up comfortably in bed  Abdominal: soft, ND, nontender to palpation, incisions C/D/I  Extremities: no edema, no calf tenderness B/L

## 2021-05-11 NOTE — DISCHARGE NOTE NURSING/CASE MANAGEMENT/SOCIAL WORK - PATIENT PORTAL LINK FT
You can access the FollowMyHealth Patient Portal offered by [facility] by registering at the following website: [email or web address]. By joining [employer]’s FollowMyHealth portal, you will also be able to view your health information using other applications (apps) compatible with our system.

## 2021-05-12 ENCOUNTER — NON-APPOINTMENT (OUTPATIENT)
Age: 61
End: 2021-05-12

## 2021-05-12 LAB — SURGICAL PATHOLOGY STUDY: SIGNIFICANT CHANGE UP

## 2021-05-13 ENCOUNTER — NON-APPOINTMENT (OUTPATIENT)
Age: 61
End: 2021-05-13

## 2021-05-18 ENCOUNTER — TRANSCRIPTION ENCOUNTER (OUTPATIENT)
Age: 61
End: 2021-05-18

## 2021-05-18 ENCOUNTER — APPOINTMENT (OUTPATIENT)
Dept: BARIATRICS | Facility: CLINIC | Age: 61
End: 2021-05-18
Payer: COMMERCIAL

## 2021-05-18 VITALS
OXYGEN SATURATION: 98 % | SYSTOLIC BLOOD PRESSURE: 110 MMHG | HEART RATE: 76 BPM | DIASTOLIC BLOOD PRESSURE: 70 MMHG | TEMPERATURE: 96.7 F | WEIGHT: 257.5 LBS | BODY MASS INDEX: 42.9 KG/M2 | HEIGHT: 65 IN

## 2021-05-18 PROCEDURE — 99024 POSTOP FOLLOW-UP VISIT: CPT

## 2021-05-18 RX ORDER — VALSARTAN AND HYDROCHLOROTHIAZIDE 80; 12.5 MG/1; MG/1
80-12.5 TABLET, FILM COATED ORAL
Qty: 30 | Refills: 2 | Status: COMPLETED | COMMUNITY
Start: 2018-10-08 | End: 2021-05-18

## 2021-05-18 RX ORDER — OXYCODONE 5 MG/1
5 TABLET ORAL
Qty: 6 | Refills: 0 | Status: COMPLETED | COMMUNITY
Start: 2021-05-03 | End: 2021-05-18

## 2021-05-19 NOTE — REVIEW OF SYSTEMS
[Recent Change In Weight] : ~T recent weight change [Negative] : Endocrine [Fever] : no fever [Chills] : no chills [Dysphagia] : no dysphagia [Hoarseness] : no hoarseness [Chest Pain] : no chest pain [Palpitations] : no palpitations [Lower Ext Edema] : no lower extremity edema [Shortness Of Breath] : no shortness of breath [Wheezing] : no wheezing [SOB on Exertion] : no shortness of breath during exertion [Abdominal Pain] : no abdominal pain [Vomiting] : no vomiting [Constipation] : no constipation [Diarrhea] : no diarrhea [Reflux/Heartburn] : no reflux/ heartburn [Dysuria] : no dysuria [Incontinence] : no incontinence [FreeTextEntry2] : weight loss

## 2021-05-19 NOTE — HISTORY OF PRESENT ILLNESS
[Procedure: ___] : Procedure performed: [unfilled]  [Date of Surgery: ___] : Date of Surgery:   [unfilled] [Surgeon Name:   ___] : Surgeon Name: Dr. NESS [de-identified] : 60 year old F 1 WEEK s/p lap sleeve gastrectomy. Weight loss since last visit. Reports minimal incisional discomfort. Pt states she  is consuming a sufficient amount of protein and  zero calorie liquid per day. Tolerating protein shakes. No reflux symptoms nausea or vomiting. Urine  is light colored. No constipation no diarrhea. Pt took Dulcolax and had a BM today.   No reflux symptoms. Walks frequently for exercise. Pt has returned to work recently. \par   [de-identified] : LAP BAND SURGERY 10 CM , Date of Surgery: 3/21/2005, Surgeon Name: Dr. HU/ TI . Pre-op weight was 250 lbs. \par \par PROCEDURE : LAP SLEEVE GASTRECTOMY - with intra op EGD -      DOS: 5/10/2021         SURGEON: LAUREN         PRE OP WEIGHT : 259 LBS.

## 2021-05-19 NOTE — ASSESSMENT
[FreeTextEntry1] : 60 year old F 1 WEEK s/p lap sleeve gastrectomy. Weight loss since last visit.\par \par Will  start daily  multivitamins and continue protein and liquid intake as instructed.\par Call for any questions or concerns.\par \par Nutritional counseling has been provided. The patient is encouraged to remain calorie conscious and continue a low fat, low carbohydrate, protein focus diet. Pt encouraged to participate in a daily exercise regimen incorporating cardio and  strength training.\par \par Dr. Azevedo saw patient. \par \par Return to office in 4 weeks or earlier if any concerns.

## 2021-05-19 NOTE — PHYSICAL EXAM
[Obese, well nourished, in no acute distress] : obese, well nourished, in no acute distress [Normal] : affect appropriate [de-identified] : MINIMAL INCISIONAL DISCOMFORT UPON PALPATION. NO ERYTHEMA NO SWELLING NOTED. INCISION SITES INTACT AND HEALING WELL.

## 2021-05-26 NOTE — H&P PST ADULT - WEIGHT IN LBS
Bedside shift change report given to Verena RN (oncoming nurse) by Iam Simpson RN (offgoing nurse). Report included the following information SBAR, Kardex, Intake/Output and MAR. [phone]

## 2021-06-03 ENCOUNTER — NON-APPOINTMENT (OUTPATIENT)
Age: 61
End: 2021-06-03

## 2021-06-10 ENCOUNTER — APPOINTMENT (OUTPATIENT)
Dept: BARIATRICS | Facility: CLINIC | Age: 61
End: 2021-06-10
Payer: COMMERCIAL

## 2021-06-10 VITALS
SYSTOLIC BLOOD PRESSURE: 120 MMHG | OXYGEN SATURATION: 98 % | HEIGHT: 65 IN | HEART RATE: 77 BPM | BODY MASS INDEX: 41.51 KG/M2 | WEIGHT: 249.12 LBS | TEMPERATURE: 96.6 F | DIASTOLIC BLOOD PRESSURE: 84 MMHG

## 2021-06-10 DIAGNOSIS — R73.03 PREDIABETES.: ICD-10-CM

## 2021-06-10 PROCEDURE — 99024 POSTOP FOLLOW-UP VISIT: CPT

## 2021-06-10 RX ORDER — ONDANSETRON 4 MG/1
4 TABLET, ORALLY DISINTEGRATING ORAL 4 TIMES DAILY
Qty: 15 | Refills: 0 | Status: COMPLETED | COMMUNITY
Start: 2021-05-03 | End: 2021-06-10

## 2021-06-10 NOTE — ASSESSMENT
[FreeTextEntry1] : 60 year old F 1 MONTH  s/p lap sleeve gastrectomy. Current weight ins 249 lbs.  Weight loss since last visit\par \par \par Will  continue  daily  multivitamins and continue protein and liquid intake as instructed.\par Call for any questions or concerns.\par \par Nutritional counseling has been provided. The patient is encouraged to remain calorie conscious and continue a low fat, low carbohydrate, protein focus diet. Pt encouraged to participate in a daily exercise regimen incorporating cardio and  strength training.\par \par Follow up with nutritionist scheduled on 7/6/2021 \par  \par Dr. Azevedo saw patient. \par \par Return to office in 4 weeks or earlier if any concerns.

## 2021-06-10 NOTE — PHYSICAL EXAM
[Obese, well nourished, in no acute distress] : obese, well nourished, in no acute distress [Normal] : affect appropriate [de-identified] : EOMI , Anicteric  [de-identified] : obese soft non-tender no evidence of hernia

## 2021-06-10 NOTE — HISTORY OF PRESENT ILLNESS
[Procedure: ___] : Procedure performed: [unfilled]  [Date of Surgery: ___] : Date of Surgery:   [unfilled] [Surgeon Name:   ___] : Surgeon Name: Dr. NESS [de-identified] : 60 year old F 1 MONTH  s/p lap sleeve gastrectomy. Current weight ins 249 lbs.  Weight loss since last visit. Pt states she is tolerating advancement of diet .No nausea. or vomiting .Pt states she  is consuming an adequate  amount of zero calorie liquid and protein during the day. Pt is consuming 3 meals per day + 2-3 snacks per day.  Pt is  taking MVI daily without any issues. No constipation or diarrhea. No reflux symptoms. Pt is gardening and swimming for exercise Pt is sleeping ~ 7- 8 hours per night. Follow up with nutritionist scheduled on 7/6/2021 \par   [de-identified] : LAP BAND SURGERY 10 CM , Date of Surgery: 3/21/2005, Surgeon Name: Dr. HU/ TI . Pre-op weight was 250 lbs. \par \par PROCEDURE : LAP SLEEVE GASTRECTOMY - with intra op EGD -      DOS: 5/10/2021         SURGEON: LAUREN         PRE OP WEIGHT : 259 LBS.

## 2021-06-21 ENCOUNTER — TRANSCRIPTION ENCOUNTER (OUTPATIENT)
Age: 61
End: 2021-06-21

## 2021-06-21 RX ORDER — VALSARTAN 80 MG/1
80 TABLET, COATED ORAL
Qty: 30 | Refills: 1 | Status: DISCONTINUED | COMMUNITY
Start: 2021-02-05 | End: 2021-06-21

## 2021-07-02 PROBLEM — Z92.29 PERSONAL HISTORY OF OTHER DRUG THERAPY: Chronic | Status: ACTIVE | Noted: 2021-04-26

## 2021-07-06 ENCOUNTER — APPOINTMENT (OUTPATIENT)
Dept: BARIATRICS/WEIGHT MGMT | Facility: CLINIC | Age: 61
End: 2021-07-06
Payer: COMMERCIAL

## 2021-07-06 VITALS — HEIGHT: 65 IN | WEIGHT: 240 LBS | BODY MASS INDEX: 39.99 KG/M2

## 2021-07-06 PROCEDURE — 97803 MED NUTRITION INDIV SUBSEQ: CPT | Mod: 95

## 2021-08-09 ENCOUNTER — RX RENEWAL (OUTPATIENT)
Age: 61
End: 2021-08-09

## 2021-08-17 ENCOUNTER — APPOINTMENT (OUTPATIENT)
Dept: BARIATRICS | Facility: CLINIC | Age: 61
End: 2021-08-17
Payer: COMMERCIAL

## 2021-08-17 VITALS
HEART RATE: 83 BPM | SYSTOLIC BLOOD PRESSURE: 112 MMHG | OXYGEN SATURATION: 98 % | WEIGHT: 243.61 LBS | DIASTOLIC BLOOD PRESSURE: 80 MMHG | BODY MASS INDEX: 40.59 KG/M2 | HEIGHT: 65 IN

## 2021-08-17 DIAGNOSIS — Z87.898 PERSONAL HISTORY OF OTHER SPECIFIED CONDITIONS: ICD-10-CM

## 2021-08-17 PROCEDURE — 99214 OFFICE O/P EST MOD 30 MIN: CPT

## 2021-08-22 PROBLEM — Z87.898 HISTORY OF WEIGHT GAIN: Status: RESOLVED | Noted: 2020-08-05 | Resolved: 2021-08-22

## 2021-08-23 LAB
25(OH)D3 SERPL-MCNC: 47.8 NG/ML
ALBUMIN SERPL ELPH-MCNC: 4.4 G/DL
ALP BLD-CCNC: 107 U/L
ALT SERPL-CCNC: 15 U/L
ANION GAP SERPL CALC-SCNC: 11 MMOL/L
AST SERPL-CCNC: 19 U/L
BASOPHILS # BLD AUTO: 0.03 K/UL
BASOPHILS NFR BLD AUTO: 0.5 %
BILIRUB SERPL-MCNC: 0.2 MG/DL
BUN SERPL-MCNC: 9 MG/DL
CALCIUM SERPL-MCNC: 9.5 MG/DL
CHLORIDE SERPL-SCNC: 104 MMOL/L
CHOLEST SERPL-MCNC: 181 MG/DL
CO2 SERPL-SCNC: 27 MMOL/L
CREAT SERPL-MCNC: 0.93 MG/DL
EOSINOPHIL # BLD AUTO: 0.08 K/UL
EOSINOPHIL NFR BLD AUTO: 1.3 %
ESTIMATED AVERAGE GLUCOSE: 114 MG/DL
FERRITIN SERPL-MCNC: 103 NG/ML
FOLATE SERPL-MCNC: 17.1 NG/ML
GLUCOSE SERPL-MCNC: 106 MG/DL
HBA1C MFR BLD HPLC: 5.6 %
HCT VFR BLD CALC: 37.6 %
HDLC SERPL-MCNC: 60 MG/DL
HGB BLD-MCNC: 11.6 G/DL
IMM GRANULOCYTES NFR BLD AUTO: 0.3 %
IRON SATN MFR SERPL: 14 %
IRON SERPL-MCNC: 37 UG/DL
LDLC SERPL CALC-MCNC: 107 MG/DL
LYMPHOCYTES # BLD AUTO: 2.1 K/UL
LYMPHOCYTES NFR BLD AUTO: 34.9 %
MAN DIFF?: NORMAL
MCHC RBC-ENTMCNC: 25.6 PG
MCHC RBC-ENTMCNC: 30.9 GM/DL
MCV RBC AUTO: 83 FL
MONOCYTES # BLD AUTO: 0.46 K/UL
MONOCYTES NFR BLD AUTO: 7.6 %
NEUTROPHILS # BLD AUTO: 3.33 K/UL
NEUTROPHILS NFR BLD AUTO: 55.4 %
NONHDLC SERPL-MCNC: 121 MG/DL
PLATELET # BLD AUTO: 285 K/UL
POTASSIUM SERPL-SCNC: 3.7 MMOL/L
PROT SERPL-MCNC: 7.1 G/DL
RBC # BLD: 4.53 M/UL
RBC # FLD: 16.9 %
SODIUM SERPL-SCNC: 142 MMOL/L
T4 FREE SERPL-MCNC: 1.1 NG/DL
TIBC SERPL-MCNC: 271 UG/DL
TRIGL SERPL-MCNC: 69 MG/DL
TSH SERPL-ACNC: 2.24 UIU/ML
UIBC SERPL-MCNC: 234 UG/DL
VIT A SERPL-MCNC: 36 UG/DL
VIT B1 SERPL-MCNC: 108.5 NMOL/L
VIT B12 SERPL-MCNC: 1461 PG/ML
VIT B2 SERPL-MCNC: 276 UG/L
VIT B6 SERPL-MCNC: 6.9 UG/L
WBC # FLD AUTO: 6.02 K/UL

## 2021-08-23 NOTE — REVIEW OF SYSTEMS
[Recent Change In Weight] : ~T recent weight change [Reflux/Heartburn] : reflux/heartburn [Negative] : Psychiatric [Fever] : no fever [Chills] : no chills [Night Sweats] : no night sweats [Fatigue] : no fatigue [Eye Pain] : no eye pain [Red Eyes] : eyes not red [Vision Problems] : no vision problems [Dysphagia] : no dysphagia [Hoarseness] : no hoarseness [Odynophagia] : no odynophagia [Abdominal Pain] : no abdominal pain [Vomiting] : no vomiting [Constipation] : no constipation [Diarrhea] : no diarrhea

## 2021-08-23 NOTE — ASSESSMENT
[FreeTextEntry1] : 61 yo F with history of lap band and lap band removal now three months s/p laparoscopic sleeve gastrectomy. Doing well and continues to lose weight. Incisions healed appropriately. Reviewed lab results with patient - Vit B12 slightly elevated\par \par Low fat, low carbohydrate, protein focused diet - increase protein intake and limit snacking\par Walk with goal of 10,000 steps daily and incorporate strength exercises\par Continue vitamins \par Continue omeprazole\par Follow up with nutritionist in October\par Follow up IN OFFICE in 1 month\par Call with any questions or concerns\par \par Additional Time was spent reviewing chart before and after visit. \par

## 2021-08-23 NOTE — HISTORY OF PRESENT ILLNESS
[Procedure: ___] : Procedure performed: [unfilled]  [Date of Surgery: ___] : Date of Surgery:   [unfilled] [Surgeon Name:   ___] : Surgeon Name: Dr. NESS [Pre-Op Weight ___] : Pre-op weight was [unfilled] lbs [de-identified] : 61 yo F with history of lap band and lap band removal now three months s/p laparoscopic sleeve gastrectomy presents for follow up visit. Lost 6 lbs since last visit. Expressed frustration with slow weight loss. Saw nutritionist last month and made some changes to diet. Tolerating advancement of diet to regular foods.  Based on brief diet recall, might not be having sufficient protein with meals, but is having adequate water daily. Some meals are just beans or avocado toast. Also, snacks on peanuts, carrots, yogurt and cottage cheese.  Taking vitamins as directed.  Recently finished daily omeprazole and developed acid reflux symptoms.  No  nausea, vomiting, diarrhea and constipation. For exercise, she is walking frequently and swimming. Recently had labs drawn. [de-identified] : LAP BAND SURGERY 10 CM , Date of Surgery: 3/21/2005, Surgeon Name: Dr. HU/ TI . Pre-op weight was 250 lbs. \par laparoscopic removal of lap band and port , Date of Surgery: 2/10/21, Surgeon Name: Dr. Azevedo .

## 2021-08-23 NOTE — PHYSICAL EXAM
[Obese] : obese [Normal] : affect appropriate [de-identified] : EOMI, anicteric  [de-identified] : obese, soft, nontender, no evidence of hernia. well healed incisions

## 2021-09-05 ENCOUNTER — RX RENEWAL (OUTPATIENT)
Age: 61
End: 2021-09-05

## 2021-09-07 ENCOUNTER — RX RENEWAL (OUTPATIENT)
Age: 61
End: 2021-09-07

## 2021-09-14 ENCOUNTER — APPOINTMENT (OUTPATIENT)
Dept: BARIATRICS | Facility: CLINIC | Age: 61
End: 2021-09-14
Payer: COMMERCIAL

## 2021-09-14 VITALS
DIASTOLIC BLOOD PRESSURE: 80 MMHG | HEART RATE: 68 BPM | WEIGHT: 242.28 LBS | OXYGEN SATURATION: 97 % | HEIGHT: 65 IN | BODY MASS INDEX: 40.37 KG/M2 | TEMPERATURE: 96.7 F | SYSTOLIC BLOOD PRESSURE: 114 MMHG

## 2021-09-14 DIAGNOSIS — Z98.84 BARIATRIC SURGERY STATUS: ICD-10-CM

## 2021-09-14 PROCEDURE — 99214 OFFICE O/P EST MOD 30 MIN: CPT

## 2021-09-15 PROBLEM — Z98.84 HISTORY OF REMOVAL OF LAPAROSCOPIC GASTRIC BANDING DEVICE: Status: ACTIVE | Noted: 2021-02-17

## 2021-09-15 NOTE — PHYSICAL EXAM
[Obese] : obese [Normal] : affect appropriate [de-identified] : EOMI, anicteric  [de-identified] : obese, soft, nontender, no evidence of hernia. well healed incisions

## 2021-09-15 NOTE — ASSESSMENT
[FreeTextEntry1] : 60 yo F with history of lap band and lap band removal now four months s/p laparoscopic sleeve gastrectomy. Doing well and frustrated with sloe weight loss. Needs to increase activity and avoid snacking.\par \par Low fat, low carbohydrate, protein focused diet - limit snacking\par Walk with goal of 10,000 steps daily and incorporate strength exercises\par Continue vitamins \par Continue omeprazole\par Follow up with nutritionist in October\par Follow up IN OFFICE in 1 month, consider referral to weight management at that time\par Call with any questions or concerns\par \par Additional Time was spent reviewing chart before and after visit. \par

## 2021-09-15 NOTE — REVIEW OF SYSTEMS
[Recent Change In Weight] : ~T recent weight change [Negative] : Allergic/Immunologic [Fever] : no fever [Chills] : no chills [Night Sweats] : no night sweats [Fatigue] : no fatigue [Eye Pain] : no eye pain [Red Eyes] : eyes not red [Vision Problems] : no vision problems [Dysphagia] : no dysphagia [Hoarseness] : no hoarseness [Odynophagia] : no odynophagia [Abdominal Pain] : no abdominal pain [Vomiting] : no vomiting [Constipation] : no constipation [Diarrhea] : no diarrhea [Reflux/Heartburn] : no reflux/ heartburn

## 2021-09-15 NOTE — HISTORY OF PRESENT ILLNESS
[Procedure: ___] : Procedure performed: [unfilled]  [Date of Surgery: ___] : Date of Surgery:   [unfilled] [Surgeon Name:   ___] : Surgeon Name: Dr. NESS [Pre-Op Weight ___] : Pre-op weight was [unfilled] lbs [de-identified] : 60 yo F with history of lap band and lap band removal now four months s/p laparoscopic sleeve gastrectomy presents for follow up visit. Lost 1 lbs since last visit. Expressed frustration with slow weight loss, but reports getting a bit off track the week before her 61th birthday party.  Saw nutritionist in the past and has follow up visit next month. Tolerating regular foods. Having sufficient protein with meals and adequate water daily.. Also, continues to snack in the afternoon and at night  on nuts, fruit and yogurt.  Taking vitamins as directed.    No  nausea, vomiting, diarrhea and constipation. For exercise, she is walking frequently and swimming.  [de-identified] : LAP BAND SURGERY 10 CM , Date of Surgery: 3/21/2005, Surgeon Name: Dr. HU/ TI . Pre-op weight was 250 lbs. \par laparoscopic removal of lap band and port , Date of Surgery: 2/10/21, Surgeon Name: Dr. Azevedo .

## 2021-09-28 ENCOUNTER — TRANSCRIPTION ENCOUNTER (OUTPATIENT)
Age: 61
End: 2021-09-28

## 2021-10-27 ENCOUNTER — APPOINTMENT (OUTPATIENT)
Dept: BARIATRICS/WEIGHT MGMT | Facility: CLINIC | Age: 61
End: 2021-10-27
Payer: COMMERCIAL

## 2021-10-27 PROCEDURE — 97803 MED NUTRITION INDIV SUBSEQ: CPT | Mod: 95

## 2021-11-01 VITALS — BODY MASS INDEX: 38.49 KG/M2 | WEIGHT: 231 LBS | HEIGHT: 65 IN

## 2021-11-02 ENCOUNTER — APPOINTMENT (OUTPATIENT)
Dept: BARIATRICS | Facility: CLINIC | Age: 61
End: 2021-11-02
Payer: COMMERCIAL

## 2021-11-02 VITALS
SYSTOLIC BLOOD PRESSURE: 115 MMHG | DIASTOLIC BLOOD PRESSURE: 78 MMHG | WEIGHT: 232.8 LBS | HEART RATE: 87 BPM | TEMPERATURE: 96.7 F | HEIGHT: 65 IN | OXYGEN SATURATION: 89 % | BODY MASS INDEX: 38.79 KG/M2

## 2021-11-02 DIAGNOSIS — Z92.29 PERSONAL HISTORY OF OTHER DRUG THERAPY: ICD-10-CM

## 2021-11-02 DIAGNOSIS — E66.01 MORBID (SEVERE) OBESITY DUE TO EXCESS CALORIES: ICD-10-CM

## 2021-11-02 DIAGNOSIS — K21.9 GASTRO-ESOPHAGEAL REFLUX DISEASE W/OUT ESOPHAGITIS: ICD-10-CM

## 2021-11-02 PROCEDURE — 99214 OFFICE O/P EST MOD 30 MIN: CPT

## 2021-11-03 PROBLEM — Z92.29 COVID-19 VACCINE SERIES COMPLETED: Status: ACTIVE | Noted: 2021-11-02

## 2021-11-03 PROBLEM — K21.9 GASTROESOPHAGEAL REFLUX DISEASE: Status: ACTIVE | Noted: 2017-09-06

## 2021-11-03 NOTE — HISTORY OF PRESENT ILLNESS
[Procedure: ___] : Procedure performed: [unfilled]  [Date of Surgery: ___] : Date of Surgery:   [unfilled] [Surgeon Name:   ___] : Surgeon Name: Dr. NESS [Pre-Op Weight ___] : Pre-op weight was [unfilled] lbs [de-identified] : 62 yo F with history of lap band and lap band removal now five months s/p laparoscopic sleeve gastrectomy. Lost 10 lbs since last visit. Saw nutritionist last month. Having sufficient protein with meals and adequate water daily. Less snacking in the afternoon. Reports improvement in acid reflux symptoms. Taking vitamins as directed.  No  nausea, vomiting, diarrhea and constipation. For exercise, walking 8-9 K steps daily.  [de-identified] : LAP BAND SURGERY 10 CM , Date of Surgery: 3/21/2005, Surgeon Name: Dr. HU/ TI . Pre-op weight was 250 lbs. \par laparoscopic removal of lap band and port , Date of Surgery: 2/10/21, Surgeon Name: Dr. Azevedo .

## 2021-11-03 NOTE — ASSESSMENT
[FreeTextEntry1] : 60 yo F with history of lap band and lap band removal now five months s/p laparoscopic sleeve gastrectomy. Doing well.\par \par Low fat, low carbohydrate, protein focused diet - continue to limit snacking\par Walk with goal of 10,000 steps daily and incorporate strength exercises\par Continue vitamins \par Continue omeprazole\par Follow up IN OFFICE in 1 month\par Call with any questions or concerns\par \par

## 2021-11-03 NOTE — REVIEW OF SYSTEMS
[Recent Change In Weight] : ~T recent weight change [Reflux/Heartburn] : reflux/heartburn [Negative] : Endocrine [Fever] : no fever [Chills] : no chills [Night Sweats] : no night sweats [Fatigue] : no fatigue [Eye Pain] : no eye pain [Red Eyes] : eyes not red [Vision Problems] : no vision problems [Dysphagia] : no dysphagia [Hoarseness] : no hoarseness [Odynophagia] : no odynophagia [Abdominal Pain] : no abdominal pain [Vomiting] : no vomiting [Constipation] : no constipation [Diarrhea] : no diarrhea

## 2021-11-03 NOTE — PHYSICAL EXAM
[Obese] : obese [Normal] : affect appropriate [de-identified] : EOMI, anicteric  [de-identified] : obese, soft, nontender, no evidence of hernia. well healed incisions [de-identified] : ambulating well

## 2022-01-11 ENCOUNTER — APPOINTMENT (OUTPATIENT)
Dept: BARIATRICS | Facility: CLINIC | Age: 62
End: 2022-01-11

## 2022-01-22 ENCOUNTER — RX RENEWAL (OUTPATIENT)
Age: 62
End: 2022-01-22

## 2022-02-20 ENCOUNTER — RX RENEWAL (OUTPATIENT)
Age: 62
End: 2022-02-20

## 2022-03-27 ENCOUNTER — RX RENEWAL (OUTPATIENT)
Age: 62
End: 2022-03-27

## 2022-03-27 ENCOUNTER — TRANSCRIPTION ENCOUNTER (OUTPATIENT)
Age: 62
End: 2022-03-27

## 2022-03-30 ENCOUNTER — RX RENEWAL (OUTPATIENT)
Age: 62
End: 2022-03-30

## 2022-04-14 ENCOUNTER — APPOINTMENT (OUTPATIENT)
Dept: INTERNAL MEDICINE | Facility: CLINIC | Age: 62
End: 2022-04-14

## 2022-05-09 ENCOUNTER — APPOINTMENT (OUTPATIENT)
Dept: INTERNAL MEDICINE | Facility: CLINIC | Age: 62
End: 2022-05-09
Payer: COMMERCIAL

## 2022-05-09 VITALS
TEMPERATURE: 96.4 F | SYSTOLIC BLOOD PRESSURE: 120 MMHG | RESPIRATION RATE: 14 BRPM | OXYGEN SATURATION: 97 % | WEIGHT: 230 LBS | HEART RATE: 91 BPM | HEIGHT: 65 IN | DIASTOLIC BLOOD PRESSURE: 86 MMHG | BODY MASS INDEX: 38.32 KG/M2

## 2022-05-09 DIAGNOSIS — Z23 ENCOUNTER FOR IMMUNIZATION: ICD-10-CM

## 2022-05-09 DIAGNOSIS — N62 HYPERTROPHY OF BREAST: ICD-10-CM

## 2022-05-09 DIAGNOSIS — M54.9 DORSALGIA, UNSPECIFIED: ICD-10-CM

## 2022-05-09 PROCEDURE — 90715 TDAP VACCINE 7 YRS/> IM: CPT

## 2022-05-09 PROCEDURE — 90471 IMMUNIZATION ADMIN: CPT

## 2022-05-09 PROCEDURE — 99396 PREV VISIT EST AGE 40-64: CPT | Mod: 25

## 2022-05-09 RX ORDER — OMEPRAZOLE 20 MG/1
20 CAPSULE, DELAYED RELEASE ORAL DAILY
Qty: 30 | Refills: 1 | Status: DISCONTINUED | COMMUNITY
Start: 2021-05-03 | End: 2022-05-09

## 2022-05-09 NOTE — HEALTH RISK ASSESSMENT
[Never] : Never [Yes] : Yes [2 - 4 times a month (2 pts)] : 2-4 times a month (2 points) [1 or 2 (0 pts)] : 1 or 2 (0 points) [No falls in past year] : Patient reported no falls in the past year [0] : 2) Feeling down, depressed, or hopeless: Not at all (0) [PHQ-2 Negative - No further assessment needed] : PHQ-2 Negative - No further assessment needed [I have developed a follow-up plan documented below in the note.] : I have developed a follow-up plan documented below in the note. [[id number]] : 0 [Patient reported mammogram was normal] : Patient reported mammogram was normal [Patient reported colonoscopy was normal] : Patient reported colonoscopy was normal [] :  [Fully functional (bathing, dressing, toileting, transferring, walking, feeding)] : Fully functional (bathing, dressing, toileting, transferring, walking, feeding) [Fully functional (using the telephone, shopping, preparing meals, housekeeping, doing laundry, using] : Fully functional and needs no help or supervision to perform IADLs (using the telephone, shopping, preparing meals, housekeeping, doing laundry, using transportation, managing medications and managing finances) [MammogramDate] : 06/21 [MammogramComments] : [facility] [ColonoscopyDate] : 06/15

## 2022-05-09 NOTE — HISTORY OF PRESENT ILLNESS
[de-identified] : Pt lost 30 lbs over the last year since gastric sleeve surgery. She did go down 4-5 sizes in clothes. \par c/o back pain from her large breasts for at least 3 yrs. her back tineo from her bras. Wears 40 DDD or E. \par Last gyn was a couple yrs ago. \par She had shingles 30 yrs ago. Has not been vaccinated. \par She is travelling to [place] in 2 days and wants to hold off on shingrix.

## 2022-05-10 LAB
25(OH)D3 SERPL-MCNC: 42.6 NG/ML
ALBUMIN SERPL ELPH-MCNC: 4.1 G/DL
ALP BLD-CCNC: 98 U/L
ALT SERPL-CCNC: 22 U/L
ANION GAP SERPL CALC-SCNC: 11 MMOL/L
APPEARANCE: CLEAR
AST SERPL-CCNC: 20 U/L
BACTERIA: NEGATIVE
BASOPHILS # BLD AUTO: 0.02 K/UL
BASOPHILS NFR BLD AUTO: 0.4 %
BILIRUB SERPL-MCNC: 0.2 MG/DL
BILIRUBIN URINE: NEGATIVE
BLOOD URINE: NEGATIVE
BUN SERPL-MCNC: 14 MG/DL
CALCIUM SERPL-MCNC: 9.7 MG/DL
CHLORIDE SERPL-SCNC: 104 MMOL/L
CHOLEST SERPL-MCNC: 200 MG/DL
CO2 SERPL-SCNC: 28 MMOL/L
COLOR: NORMAL
CREAT SERPL-MCNC: 1.03 MG/DL
EGFR: 62 ML/MIN/1.73M2
EOSINOPHIL # BLD AUTO: 0.08 K/UL
EOSINOPHIL NFR BLD AUTO: 1.4 %
ESTIMATED AVERAGE GLUCOSE: 111 MG/DL
FERRITIN SERPL-MCNC: 44 NG/ML
FOLATE SERPL-MCNC: 10.4 NG/ML
GLUCOSE QUALITATIVE U: NEGATIVE
GLUCOSE SERPL-MCNC: 90 MG/DL
HBA1C MFR BLD HPLC: 5.5 %
HCT VFR BLD CALC: 38.7 %
HDLC SERPL-MCNC: 74 MG/DL
HGB BLD-MCNC: 11.7 G/DL
HYALINE CASTS: 0 /LPF
IMM GRANULOCYTES NFR BLD AUTO: 0.2 %
IRON SATN MFR SERPL: 14 %
IRON SERPL-MCNC: 46 UG/DL
KETONES URINE: NEGATIVE
LDLC SERPL CALC-MCNC: 113 MG/DL
LEUKOCYTE ESTERASE URINE: NEGATIVE
LYMPHOCYTES # BLD AUTO: 2.1 K/UL
LYMPHOCYTES NFR BLD AUTO: 37.3 %
MAN DIFF?: NORMAL
MCHC RBC-ENTMCNC: 26.1 PG
MCHC RBC-ENTMCNC: 30.2 GM/DL
MCV RBC AUTO: 86.4 FL
MICROSCOPIC-UA: NORMAL
MONOCYTES # BLD AUTO: 0.47 K/UL
MONOCYTES NFR BLD AUTO: 8.3 %
NEUTROPHILS # BLD AUTO: 2.95 K/UL
NEUTROPHILS NFR BLD AUTO: 52.4 %
NITRITE URINE: NEGATIVE
NONHDLC SERPL-MCNC: 125 MG/DL
PH URINE: 5.5
PLATELET # BLD AUTO: 278 K/UL
POTASSIUM SERPL-SCNC: 4.1 MMOL/L
PROT SERPL-MCNC: 7 G/DL
PROTEIN URINE: NEGATIVE
RBC # BLD: 4.48 M/UL
RBC # FLD: 15.4 %
RED BLOOD CELLS URINE: 3 /HPF
SODIUM SERPL-SCNC: 143 MMOL/L
SPECIFIC GRAVITY URINE: 1.01
SQUAMOUS EPITHELIAL CELLS: 0 /HPF
TIBC SERPL-MCNC: 318 UG/DL
TRIGL SERPL-MCNC: 60 MG/DL
TSH SERPL-ACNC: 1.7 UIU/ML
UIBC SERPL-MCNC: 272 UG/DL
URATE SERPL-MCNC: 5.8 MG/DL
UROBILINOGEN URINE: NORMAL
VIT B12 SERPL-MCNC: 817 PG/ML
WBC # FLD AUTO: 5.63 K/UL
WHITE BLOOD CELLS URINE: 0 /HPF

## 2022-05-23 ENCOUNTER — RX RENEWAL (OUTPATIENT)
Age: 62
End: 2022-05-23

## 2022-06-02 ENCOUNTER — APPOINTMENT (OUTPATIENT)
Dept: MAMMOGRAPHY | Facility: CLINIC | Age: 62
End: 2022-06-02
Payer: COMMERCIAL

## 2022-06-02 ENCOUNTER — OUTPATIENT (OUTPATIENT)
Dept: OUTPATIENT SERVICES | Facility: HOSPITAL | Age: 62
LOS: 1 days | End: 2022-06-02
Payer: COMMERCIAL

## 2022-06-02 ENCOUNTER — RESULT REVIEW (OUTPATIENT)
Age: 62
End: 2022-06-02

## 2022-06-02 DIAGNOSIS — Z98.84 BARIATRIC SURGERY STATUS: Chronic | ICD-10-CM

## 2022-06-02 DIAGNOSIS — Z96.652 PRESENCE OF LEFT ARTIFICIAL KNEE JOINT: Chronic | ICD-10-CM

## 2022-06-02 DIAGNOSIS — Z98.890 OTHER SPECIFIED POSTPROCEDURAL STATES: Chronic | ICD-10-CM

## 2022-06-02 DIAGNOSIS — Z00.00 ENCOUNTER FOR GENERAL ADULT MEDICAL EXAMINATION WITHOUT ABNORMAL FINDINGS: ICD-10-CM

## 2022-06-02 DIAGNOSIS — Z98.42 CATARACT EXTRACTION STATUS, LEFT EYE: Chronic | ICD-10-CM

## 2022-06-02 DIAGNOSIS — Z00.8 ENCOUNTER FOR OTHER GENERAL EXAMINATION: ICD-10-CM

## 2022-06-02 DIAGNOSIS — Z98.89 OTHER SPECIFIED POSTPROCEDURAL STATES: Chronic | ICD-10-CM

## 2022-06-02 PROCEDURE — 77063 BREAST TOMOSYNTHESIS BI: CPT

## 2022-06-02 PROCEDURE — 77067 SCR MAMMO BI INCL CAD: CPT | Mod: 26

## 2022-06-02 PROCEDURE — 77067 SCR MAMMO BI INCL CAD: CPT

## 2022-06-02 PROCEDURE — 77063 BREAST TOMOSYNTHESIS BI: CPT | Mod: 26

## 2022-06-06 ENCOUNTER — OUTPATIENT (OUTPATIENT)
Dept: OUTPATIENT SERVICES | Facility: HOSPITAL | Age: 62
LOS: 1 days | End: 2022-06-06
Payer: COMMERCIAL

## 2022-06-06 ENCOUNTER — RESULT REVIEW (OUTPATIENT)
Age: 62
End: 2022-06-06

## 2022-06-06 ENCOUNTER — APPOINTMENT (OUTPATIENT)
Dept: PLASTIC SURGERY | Facility: CLINIC | Age: 62
End: 2022-06-06
Payer: COMMERCIAL

## 2022-06-06 ENCOUNTER — APPOINTMENT (OUTPATIENT)
Dept: MAMMOGRAPHY | Facility: CLINIC | Age: 62
End: 2022-06-06
Payer: COMMERCIAL

## 2022-06-06 ENCOUNTER — APPOINTMENT (OUTPATIENT)
Dept: ULTRASOUND IMAGING | Facility: CLINIC | Age: 62
End: 2022-06-06
Payer: COMMERCIAL

## 2022-06-06 VITALS
DIASTOLIC BLOOD PRESSURE: 84 MMHG | SYSTOLIC BLOOD PRESSURE: 122 MMHG | RESPIRATION RATE: 14 BRPM | HEART RATE: 95 BPM | BODY MASS INDEX: 38.32 KG/M2 | TEMPERATURE: 97.2 F | WEIGHT: 230 LBS | OXYGEN SATURATION: 96 % | HEIGHT: 65 IN

## 2022-06-06 DIAGNOSIS — Z98.42 CATARACT EXTRACTION STATUS, LEFT EYE: Chronic | ICD-10-CM

## 2022-06-06 DIAGNOSIS — Z98.890 OTHER SPECIFIED POSTPROCEDURAL STATES: Chronic | ICD-10-CM

## 2022-06-06 DIAGNOSIS — Z96.652 PRESENCE OF LEFT ARTIFICIAL KNEE JOINT: Chronic | ICD-10-CM

## 2022-06-06 DIAGNOSIS — Z00.8 ENCOUNTER FOR OTHER GENERAL EXAMINATION: ICD-10-CM

## 2022-06-06 DIAGNOSIS — Z98.84 BARIATRIC SURGERY STATUS: Chronic | ICD-10-CM

## 2022-06-06 DIAGNOSIS — Z98.89 OTHER SPECIFIED POSTPROCEDURAL STATES: Chronic | ICD-10-CM

## 2022-06-06 PROCEDURE — 76642 ULTRASOUND BREAST LIMITED: CPT | Mod: 26,LT

## 2022-06-06 PROCEDURE — 77065 DX MAMMO INCL CAD UNI: CPT | Mod: 26,LT

## 2022-06-06 PROCEDURE — 77061 BREAST TOMOSYNTHESIS UNI: CPT | Mod: 26

## 2022-06-06 PROCEDURE — 76642 ULTRASOUND BREAST LIMITED: CPT

## 2022-06-06 PROCEDURE — 99203 OFFICE O/P NEW LOW 30 MIN: CPT

## 2022-06-06 PROCEDURE — 77065 DX MAMMO INCL CAD UNI: CPT

## 2022-06-06 PROCEDURE — G0279: CPT

## 2022-06-07 ENCOUNTER — NON-APPOINTMENT (OUTPATIENT)
Age: 62
End: 2022-06-07

## 2022-06-15 ENCOUNTER — NON-APPOINTMENT (OUTPATIENT)
Age: 62
End: 2022-06-15

## 2022-06-23 ENCOUNTER — OUTPATIENT (OUTPATIENT)
Dept: OUTPATIENT SERVICES | Facility: HOSPITAL | Age: 62
LOS: 1 days | End: 2022-06-23
Payer: COMMERCIAL

## 2022-06-23 VITALS
OXYGEN SATURATION: 98 % | HEART RATE: 78 BPM | TEMPERATURE: 97 F | HEIGHT: 65 IN | WEIGHT: 231.93 LBS | DIASTOLIC BLOOD PRESSURE: 73 MMHG | RESPIRATION RATE: 16 BRPM | SYSTOLIC BLOOD PRESSURE: 106 MMHG

## 2022-06-23 DIAGNOSIS — Z98.890 OTHER SPECIFIED POSTPROCEDURAL STATES: Chronic | ICD-10-CM

## 2022-06-23 DIAGNOSIS — N62 HYPERTROPHY OF BREAST: ICD-10-CM

## 2022-06-23 DIAGNOSIS — Z96.652 PRESENCE OF LEFT ARTIFICIAL KNEE JOINT: Chronic | ICD-10-CM

## 2022-06-23 DIAGNOSIS — Z98.42 CATARACT EXTRACTION STATUS, LEFT EYE: Chronic | ICD-10-CM

## 2022-06-23 DIAGNOSIS — Z98.89 OTHER SPECIFIED POSTPROCEDURAL STATES: Chronic | ICD-10-CM

## 2022-06-23 DIAGNOSIS — E78.5 HYPERLIPIDEMIA, UNSPECIFIED: ICD-10-CM

## 2022-06-23 DIAGNOSIS — Z98.84 BARIATRIC SURGERY STATUS: Chronic | ICD-10-CM

## 2022-06-23 DIAGNOSIS — F41.9 ANXIETY DISORDER, UNSPECIFIED: ICD-10-CM

## 2022-06-23 DIAGNOSIS — Z01.818 ENCOUNTER FOR OTHER PREPROCEDURAL EXAMINATION: ICD-10-CM

## 2022-06-23 DIAGNOSIS — I10 ESSENTIAL (PRIMARY) HYPERTENSION: ICD-10-CM

## 2022-06-23 DIAGNOSIS — K21.9 GASTRO-ESOPHAGEAL REFLUX DISEASE WITHOUT ESOPHAGITIS: ICD-10-CM

## 2022-06-23 PROCEDURE — 36415 COLL VENOUS BLD VENIPUNCTURE: CPT

## 2022-06-23 PROCEDURE — G0463: CPT

## 2022-06-23 PROCEDURE — 93010 ELECTROCARDIOGRAM REPORT: CPT | Mod: NC

## 2022-06-23 PROCEDURE — 93005 ELECTROCARDIOGRAM TRACING: CPT

## 2022-06-23 RX ORDER — ACETAMINOPHEN 500 MG
15 TABLET ORAL
Qty: 0 | Refills: 0 | DISCHARGE

## 2022-06-23 RX ORDER — VALSARTAN 80 MG/1
1 TABLET ORAL
Qty: 0 | Refills: 0 | DISCHARGE

## 2022-06-23 RX ORDER — OXYCODONE HYDROCHLORIDE 5 MG/1
1 TABLET ORAL
Qty: 0 | Refills: 0 | DISCHARGE

## 2022-06-23 RX ORDER — ONDANSETRON 8 MG/1
1 TABLET, FILM COATED ORAL
Qty: 0 | Refills: 0 | DISCHARGE

## 2022-06-23 NOTE — H&P PST ADULT - HISTORY OF PRESENT ILLNESS
60yo female with medical h/o HTN, HDL and Anxiety, reports Large breast causing back pain. Pt presents today for PST for scheduled Bilateral Breast Reduction on 7/6/2022

## 2022-06-23 NOTE — H&P PST ADULT - NSICDXPASTSURGICALHX_GEN_ALL_CORE_FT
PAST SURGICAL HISTORY:  H/O total knee replacement, left 2015    History of removal of laparoscopic gastric banding device 2021    S/P cataract surgery, left     S/P  section     S/P hernia surgery 2021    Status post gastric banding 2005

## 2022-06-23 NOTE — H&P PST ADULT - FALL HARM RISK - UNIVERSAL INTERVENTIONS
Bed in lowest position, wheels locked, appropriate side rails in place/Call bell, personal items and telephone in reach/Instruct patient to call for assistance before getting out of bed or chair/[place] to call system/Physically safe environment - no spills, clutter or unnecessary equipment/Purposeful Proactive Rounding/Room/bathroom lighting operational, light cord in reach
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
## 2022-06-23 NOTE — H&P PST ADULT - PROBLEM SELECTOR PLAN 1
Pre-op instructions given. Pt verbalized understanding  Chlorhexidine wash instructions given  Pending: Covidpcr test/results

## 2022-06-23 NOTE — H&P PST ADULT - NSANTHOSAYNRD_GEN_A_CORE
neck 13.5inches/No. ARCHANA screening performed.  STOP BANG Legend: 0-2 = LOW Risk; 3-4 = INTERMEDIATE Risk; 5-8 = HIGH Risk

## 2022-06-30 ENCOUNTER — APPOINTMENT (OUTPATIENT)
Dept: INTERNAL MEDICINE | Facility: CLINIC | Age: 62
End: 2022-06-30

## 2022-06-30 VITALS
HEIGHT: 65 IN | BODY MASS INDEX: 38.32 KG/M2 | SYSTOLIC BLOOD PRESSURE: 118 MMHG | WEIGHT: 230 LBS | RESPIRATION RATE: 14 BRPM | OXYGEN SATURATION: 98 % | DIASTOLIC BLOOD PRESSURE: 76 MMHG | TEMPERATURE: 98.3 F | HEART RATE: 83 BPM

## 2022-06-30 PROCEDURE — 99214 OFFICE O/P EST MOD 30 MIN: CPT

## 2022-06-30 NOTE — REVIEW OF SYSTEMS
[As Noted in HPI] : as noted in HPI [Skin Lesions] : skin lesion [Skin Wound] : skin wound [Negative] : Heme/Lymph [Fever] : no fever [Chills] : no chills [FreeTextEntry9] : neck back and shoulder pain

## 2022-06-30 NOTE — PLAN
[FreeTextEntry1] : labs from May 2022 wnl \par EKG NSR \par pt medically optimized for planned procedure

## 2022-06-30 NOTE — HISTORY OF PRESENT ILLNESS
[No Pertinent Cardiac History] : no history of aortic stenosis, atrial fibrillation, coronary artery disease, recent myocardial infarction, or implantable device/pacemaker [No Pertinent Pulmonary History] : no history of asthma, COPD, sleep apnea, or smoking [No Adverse Anesthesia Reaction] : no adverse anesthesia reaction in self or family member [(Patient denies any chest pain, claudication, dyspnea on exertion, orthopnea, palpitations or syncope)] : Patient denies any chest pain, claudication, dyspnea on exertion, orthopnea, palpitations or syncope [Good (7-10 METs)] : Good (7-10 METs) [Chronic Anticoagulation] : no chronic anticoagulation [Chronic Kidney Disease] : no chronic kidney disease [Diabetes] : no diabetes [FreeTextEntry1] : breast reduction  [FreeTextEntry2] : 7/6 [FreeTextEntry3] : Dr. Calderón  [FreeTextEntry4] : Pt here for MCA. Feeling well, no acute complaints.

## 2022-06-30 NOTE — PHYSICAL EXAM
[NI] : Normal [de-identified] : NAD, AxOX3  [de-identified] : nonlabored breathing  [de-identified] : normal HR [de-identified] : Bilateral breasts- no masses, no nipple discharge or retraction. There is asymmetry with right >left. There is grade 3 ptosis.\par There is bilateral bra strap shoulder grooving present. \par \par RIGHT:\par SN-N 42\par N-IMF 16\par BW 16\par \par LEFT:\par SN-N 42\par N-IMF 16.5\par BW 15  [de-identified] : no edema [de-identified] : as above  [de-identified] : normal affect

## 2022-06-30 NOTE — HISTORY OF PRESENT ILLNESS
[FreeTextEntry1] : STEPH SAENZ is a 61 year F presenting with symptomatic macromastia, requesting consultation for breast reduction surgery. Patient states that she suffers from long standing neck, back and shoulder pain, with bra strap grooving from her heavy breasts. Patient admits to worsening upper back pain upon prolonged standing due to her large breasts. Patient states she also experiences irritation and open wounds to underneath her breasts which she treats with vasoline. \par Patient has attempted massage without resolution of her symptoms and has resorted to taking tylenol for her back pain, without any relief. This as been ongoing for many years.\par \par Patient admits to history of weight loss from bariatric surgery. \par She has had knee replacement surgery.\par \par PMHx- HTN, HLD\par PSHx- laparoscopic sleeve gastrectomy, lap gastric band, lap gastric band removal, Knee replacement, Csection\par Allergies denies\par \par She currently wears a bra size 40DDD, she wishes for C cup\par \par Personal and family history is significant for her mother- HTN, HLD and her father- HTN, HLD, cardiac h/o\par She denies any family or personal history of breast cancer or breast related illnesses.\par \par Her last mammogram was today. She states she goes yearly\par She denies tobacco use\par She works as a teacher. \par \par \par \par \par

## 2022-06-30 NOTE — CONSULT LETTER
[Dear  ___] : Dear  [unfilled], [Consult Letter:] : I had the pleasure of evaluating your patient, [unfilled]. [Please see my note below.] : Please see my note below. [Consult Closing:] : Thank you very much for allowing me to participate in the care of this patient.  If you have any questions, please do not hesitate to contact me. [Sincerely,] : Sincerely, [FreeTextEntry2] : Dr. Sandra Noe [FreeTextEntry3] : Lauren Shikowitz-Behr, MD

## 2022-07-05 LAB — SARS-COV-2 N GENE NPH QL NAA+PROBE: NOT DETECTED

## 2022-07-11 ENCOUNTER — APPOINTMENT (OUTPATIENT)
Dept: PLASTIC SURGERY | Facility: CLINIC | Age: 62
End: 2022-07-11

## 2022-07-15 ENCOUNTER — RX RENEWAL (OUTPATIENT)
Age: 62
End: 2022-07-15

## 2022-07-18 ENCOUNTER — TRANSCRIPTION ENCOUNTER (OUTPATIENT)
Age: 62
End: 2022-07-18

## 2022-07-19 ENCOUNTER — RESULT REVIEW (OUTPATIENT)
Age: 62
End: 2022-07-19

## 2022-07-19 ENCOUNTER — TRANSCRIPTION ENCOUNTER (OUTPATIENT)
Age: 62
End: 2022-07-19

## 2022-07-19 ENCOUNTER — OUTPATIENT (OUTPATIENT)
Dept: OUTPATIENT SERVICES | Facility: HOSPITAL | Age: 62
LOS: 1 days | End: 2022-07-19
Payer: COMMERCIAL

## 2022-07-19 ENCOUNTER — APPOINTMENT (OUTPATIENT)
Dept: PLASTIC SURGERY | Facility: HOSPITAL | Age: 62
End: 2022-07-19

## 2022-07-19 VITALS
HEART RATE: 78 BPM | WEIGHT: 231.93 LBS | DIASTOLIC BLOOD PRESSURE: 73 MMHG | OXYGEN SATURATION: 99 % | RESPIRATION RATE: 18 BRPM | HEIGHT: 65 IN | TEMPERATURE: 97 F | SYSTOLIC BLOOD PRESSURE: 123 MMHG

## 2022-07-19 VITALS
DIASTOLIC BLOOD PRESSURE: 76 MMHG | RESPIRATION RATE: 16 BRPM | TEMPERATURE: 98 F | HEART RATE: 90 BPM | SYSTOLIC BLOOD PRESSURE: 122 MMHG | OXYGEN SATURATION: 97 %

## 2022-07-19 DIAGNOSIS — Z96.652 PRESENCE OF LEFT ARTIFICIAL KNEE JOINT: Chronic | ICD-10-CM

## 2022-07-19 DIAGNOSIS — Z98.890 OTHER SPECIFIED POSTPROCEDURAL STATES: Chronic | ICD-10-CM

## 2022-07-19 DIAGNOSIS — Z98.84 BARIATRIC SURGERY STATUS: Chronic | ICD-10-CM

## 2022-07-19 DIAGNOSIS — Z98.89 OTHER SPECIFIED POSTPROCEDURAL STATES: Chronic | ICD-10-CM

## 2022-07-19 DIAGNOSIS — Z90.3 ACQUIRED ABSENCE OF STOMACH [PART OF]: Chronic | ICD-10-CM

## 2022-07-19 DIAGNOSIS — N62 HYPERTROPHY OF BREAST: ICD-10-CM

## 2022-07-19 DIAGNOSIS — Z98.42 CATARACT EXTRACTION STATUS, LEFT EYE: Chronic | ICD-10-CM

## 2022-07-19 LAB — SARS-COV-2 N GENE NPH QL NAA+PROBE: NOT DETECTED

## 2022-07-19 PROCEDURE — 88305 TISSUE EXAM BY PATHOLOGIST: CPT

## 2022-07-19 PROCEDURE — C9399: CPT

## 2022-07-19 PROCEDURE — 19318 BREAST REDUCTION: CPT | Mod: LT,RT

## 2022-07-19 PROCEDURE — 19318 BREAST REDUCTION: CPT | Mod: 50

## 2022-07-19 PROCEDURE — 88305 TISSUE EXAM BY PATHOLOGIST: CPT | Mod: 26

## 2022-07-19 RX ORDER — SODIUM CHLORIDE 9 MG/ML
1000 INJECTION, SOLUTION INTRAVENOUS
Refills: 0 | Status: DISCONTINUED | OUTPATIENT
Start: 2022-07-19 | End: 2022-08-02

## 2022-07-19 RX ORDER — HYDROMORPHONE HYDROCHLORIDE 2 MG/ML
0.5 INJECTION INTRAMUSCULAR; INTRAVENOUS; SUBCUTANEOUS
Refills: 0 | Status: DISCONTINUED | OUTPATIENT
Start: 2022-07-19 | End: 2022-07-19

## 2022-07-19 RX ORDER — SODIUM CHLORIDE 9 MG/ML
1000 INJECTION, SOLUTION INTRAVENOUS
Refills: 0 | Status: DISCONTINUED | OUTPATIENT
Start: 2022-07-19 | End: 2022-07-19

## 2022-07-19 RX ORDER — ROSUVASTATIN CALCIUM 5 MG/1
1 TABLET ORAL
Qty: 0 | Refills: 0 | DISCHARGE

## 2022-07-19 RX ORDER — ONDANSETRON 8 MG/1
4 TABLET, FILM COATED ORAL ONCE
Refills: 0 | Status: DISCONTINUED | OUTPATIENT
Start: 2022-07-19 | End: 2022-07-19

## 2022-07-19 RX ORDER — OXYCODONE HYDROCHLORIDE 5 MG/1
5 TABLET ORAL EVERY 4 HOURS
Refills: 0 | Status: DISCONTINUED | OUTPATIENT
Start: 2022-07-19 | End: 2022-07-19

## 2022-07-19 RX ORDER — SERTRALINE 25 MG/1
1 TABLET, FILM COATED ORAL
Qty: 0 | Refills: 0 | DISCHARGE

## 2022-07-19 RX ORDER — DIPHENHYDRAMINE HCL 50 MG
1 CAPSULE ORAL
Qty: 0 | Refills: 0 | DISCHARGE

## 2022-07-19 RX ORDER — ACETAMINOPHEN 500 MG
650 TABLET ORAL ONCE
Refills: 0 | Status: DISCONTINUED | OUTPATIENT
Start: 2022-07-19 | End: 2022-08-02

## 2022-07-19 RX ORDER — CHOLECALCIFEROL (VITAMIN D3) 125 MCG
1 CAPSULE ORAL
Qty: 0 | Refills: 0 | DISCHARGE

## 2022-07-19 RX ORDER — OMEPRAZOLE 10 MG/1
1 CAPSULE, DELAYED RELEASE ORAL
Qty: 0 | Refills: 0 | DISCHARGE

## 2022-07-19 RX ORDER — ASCORBIC ACID 60 MG
1 TABLET,CHEWABLE ORAL
Qty: 0 | Refills: 0 | DISCHARGE

## 2022-07-19 RX ORDER — ACETAMINOPHEN 500 MG
2 TABLET ORAL
Qty: 0 | Refills: 0 | DISCHARGE

## 2022-07-19 RX ORDER — OXYCODONE HYDROCHLORIDE 5 MG/1
10 TABLET ORAL EVERY 4 HOURS
Refills: 0 | Status: DISCONTINUED | OUTPATIENT
Start: 2022-07-19 | End: 2022-07-19

## 2022-07-19 RX ORDER — LANOLIN ALCOHOL/MO/W.PET/CERES
1 CREAM (GRAM) TOPICAL
Qty: 0 | Refills: 0 | DISCHARGE

## 2022-07-19 RX ADMIN — HYDROMORPHONE HYDROCHLORIDE 0.5 MILLIGRAM(S): 2 INJECTION INTRAMUSCULAR; INTRAVENOUS; SUBCUTANEOUS at 13:40

## 2022-07-19 RX ADMIN — HYDROMORPHONE HYDROCHLORIDE 0.5 MILLIGRAM(S): 2 INJECTION INTRAMUSCULAR; INTRAVENOUS; SUBCUTANEOUS at 13:14

## 2022-07-19 RX ADMIN — HYDROMORPHONE HYDROCHLORIDE 0.5 MILLIGRAM(S): 2 INJECTION INTRAMUSCULAR; INTRAVENOUS; SUBCUTANEOUS at 13:30

## 2022-07-19 RX ADMIN — HYDROMORPHONE HYDROCHLORIDE 0.5 MILLIGRAM(S): 2 INJECTION INTRAMUSCULAR; INTRAVENOUS; SUBCUTANEOUS at 13:56

## 2022-07-19 RX ADMIN — SODIUM CHLORIDE 50 MILLILITER(S): 9 INJECTION, SOLUTION INTRAVENOUS at 07:51

## 2022-07-19 NOTE — ASU DISCHARGE PLAN (ADULT/PEDIATRIC) - CARE PROVIDER_API CALL
ShikowitzBehr, Lauren B (MD)  Plastic Surgery  [address] [place] [address]  Phone: [phone]  Fax: [phone]  Follow Up Time:

## 2022-07-19 NOTE — ASU PATIENT PROFILE, ADULT - NSICDXPASTMEDICALHX_GEN_ALL_CORE_FT
PAST MEDICAL HISTORY:  Anxiety     COVID-19 vaccine series completed     GERD (gastroesophageal reflux disease)     HTN (hypertension)     Hypercholesterolemia     Morbid obesity with BMI of 40.0-44.9, adult

## 2022-07-19 NOTE — ASU DISCHARGE PLAN (ADULT/PEDIATRIC) - NURSING INSTRUCTIONS
Drink plenty of fluids.  Elevate head with 2-3 pillows.  Record drain output on form and bring to follow up appointment.

## 2022-07-19 NOTE — BRIEF OPERATIVE NOTE - OPERATION/FINDINGS
As above  Right breast tissue and skin weight 1047 grams  Left breast tissue and skin weight 1013 grams

## 2022-07-19 NOTE — ASU DISCHARGE PLAN (ADULT/PEDIATRIC) - ASU DC SPECIAL INSTRUCTIONSFT
Keep dressings and incisions dry for 48 hrs. May shower after 48 hrs  Replace bra after every shower. Keep back to shower water  Empty and record drain outputs twice a day  No heavy lifting or straining  Ambulate frequently  Keep white steri strips in place  use your incentive spirometer 10x/day for the next week  Followup with Dr Calderón in one week  ([phone])

## 2022-07-19 NOTE — ASU PATIENT PROFILE, ADULT - NSICDXPASTSURGICALHX_GEN_ALL_CORE_FT
PAST SURGICAL HISTORY:  H/O total knee replacement, left 2015    History of removal of laparoscopic gastric banding device 2021    S/P cataract surgery, left     S/P  section     S/P hernia surgery 2021    Status post gastric banding 2005     PAST SURGICAL HISTORY:  H/O gastric sleeve May 2021    H/O total knee replacement, left 2015    History of removal of laparoscopic gastric banding device 2021    S/P cataract surgery, left     S/P  section     S/P hernia surgery 2021    Status post gastric banding 2005

## 2022-07-25 ENCOUNTER — APPOINTMENT (OUTPATIENT)
Dept: PLASTIC SURGERY | Facility: CLINIC | Age: 62
End: 2022-07-25

## 2022-07-25 LAB — SURGICAL PATHOLOGY STUDY: SIGNIFICANT CHANGE UP

## 2022-07-25 PROCEDURE — 99024 POSTOP FOLLOW-UP VISIT: CPT

## 2022-07-28 NOTE — PHYSICAL EXAM
[NI] : Normal [de-identified] : NAD, AxOX3  [de-identified] : Bilateral breasts with steri strips in place. Incision is clean, dry and intact. There is some expected swelling bilaterally. There is no evidence of bleeding, infection or skin breakdown. Drains are serosanguinous in output. [de-identified] : nonlabored breathing  [de-identified] : no UE/.LE edema [de-identified] : as above  [de-identified] : normal affect

## 2022-07-28 NOTE — HISTORY OF PRESENT ILLNESS
[FreeTextEntry1] : Yaima Chairez is a 60 y/o female s/p bilateral breast reduction on 7/19/22. Patient c/o some pain at incision sites but she finds relief with pain meds. She otherwise has no other complaints. Drain output has met criteria for removal .\par Patient is pleased with her results

## 2022-08-01 ENCOUNTER — APPOINTMENT (OUTPATIENT)
Dept: PLASTIC SURGERY | Facility: CLINIC | Age: 62
End: 2022-08-01

## 2022-08-01 PROCEDURE — 99024 POSTOP FOLLOW-UP VISIT: CPT

## 2022-08-03 NOTE — REASON FOR VISIT
Lactation Progress Note      Data:   F/U on multip breast feeder who was not successful with breast feeding first baby. Hx of oversupply and reflux. Mob reports that this baby has been feeding well and breasts are filling. Output and wt loss are WNL. Action: Discharge instructions provided focusing on engorgement and oversupply prevention and treatment. Also reviewed well fed baby check list. Encouraged to call Outpatient [facility] or [facility] for f/u prn. Response: Verbalized understanding. Comfortable with breast feeding for d/c.
[Post Op: _________] : a [unfilled] post op visit

## 2022-08-03 NOTE — HISTORY OF PRESENT ILLNESS
[FreeTextEntry1] : Yaima Chairez is a 62 y/o female s/p bilateral breast reduction on 7/19/22. Patient complaining of some pain but denies any other complaints. Her back and neck pain is relieved and she is pleased with her results\par

## 2022-08-03 NOTE — PHYSICAL EXAM
[NI] : Normal [de-identified] : NAD, AxOx3  [de-identified] : nonlabored breathing  [de-identified] : Bilateral breasts with incisions clean, dry and intact. There is some Tpiont epidermolysis noted bilaterally. NAC is viable bilaterally. There is no evidence of infection or fluid collection. [de-identified] : no UE/LE edema  [de-identified] : as above  [de-identified] : normal affect

## 2022-08-08 ENCOUNTER — APPOINTMENT (OUTPATIENT)
Dept: PLASTIC SURGERY | Facility: CLINIC | Age: 62
End: 2022-08-08

## 2022-08-08 PROCEDURE — 99024 POSTOP FOLLOW-UP VISIT: CPT

## 2022-08-11 NOTE — PHYSICAL EXAM
[NI] : Normal [de-identified] : NAD, AxOx3  [de-identified] : nonlabored breathing  [de-identified] : bilateral breasts with incisions clean, dry and intact. The right breast has improved in appearance. Left with minimal epidermolysis to the superior aspect of the vertical incision. Bilateral NAC is viable and sensation is intact. There is no evidence of infection or fluid collection  [de-identified] : soft, there is an overhanging pannus extending to the mons pubis. +striae. ?umbilical hernia?. There is upper abdominal lipodystrophyl\par  [de-identified] : no edema  [de-identified] : normal affect

## 2022-08-11 NOTE — HISTORY OF PRESENT ILLNESS
[FreeTextEntry1] : Yaima Chairez is a 60 y/o female s/p bilateral breast reduction on 7/19/22. Patient has no complaints today. She denies any further back or neck pain and is pleased with her results. She has been performing xeroform dressing changes to Tpoint/vertical incision.\par Patient requesting discussion concerning panniculectomy/abdominoplasty. Patient admits to excessive skin to her abdomen. She admits to difficulty fitting properly into clothing due to her pannus. She as well admits to frequent rashes and irritation to under her pannus for which she has attempted good hygiene, powders, creams and dressings without any resolution. \par

## 2022-08-29 ENCOUNTER — APPOINTMENT (OUTPATIENT)
Dept: PLASTIC SURGERY | Facility: CLINIC | Age: 62
End: 2022-08-29

## 2022-08-29 DIAGNOSIS — N62 HYPERTROPHY OF BREAST: ICD-10-CM

## 2022-08-29 PROCEDURE — 99024 POSTOP FOLLOW-UP VISIT: CPT

## 2022-09-06 PROBLEM — N62 MACROMASTIA: Status: ACTIVE | Noted: 2022-06-09

## 2022-09-07 NOTE — HISTORY OF PRESENT ILLNESS
[FreeTextEntry1] : Yaima Chairez is a 62 y/o female s/p bilateral breast reduction on 7/19/22. Patient complaining mild on and off discomfort but denies any significant pain. She states her back and neck pain has improved and she is pleased with her results. \par \par Patient again requesting further discussion concerning panniculectomy/abdominoplasty. Patient admits to excessive skin to her abdomen. She admits to difficulty fitting properly into clothing due to her pannus. 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 She as well  admits to frequent rashes and irritation to under her pannus for which she has attempted good hygiene, powders, creams and dressings without any resolution. She currently has an open wound which is being treated with local wound care. \par \par She has been weight stable for over 6 months. Her symptoms have persisted for several years.

## 2022-09-07 NOTE — PHYSICAL EXAM
[de-identified] : NAD, AxOx3  [de-identified] : nonlabored breathing  [de-identified] : Bilateral breasts with incisions clean dry and intact. Healing well. NAC is viable and sensation is intact bilaterally. There is no evidence of infection, fluid collection or skin breakdown.  [de-identified] : soft, there is an overhanging pannus extending below the mons pubis. +striae. There is upper abdominal  lipodystrophy .There were noted to be several small open wounds to beneath her pannus. There is no evidence of infection  [de-identified] : no edema  [de-identified] : as above  [de-identified] : normal affect

## 2022-09-10 ENCOUNTER — TRANSCRIPTION ENCOUNTER (OUTPATIENT)
Age: 62
End: 2022-09-10

## 2022-09-13 ENCOUNTER — RX RENEWAL (OUTPATIENT)
Age: 62
End: 2022-09-13

## 2022-09-20 NOTE — H&P PST ADULT - PULMONARY EMBOLUS
[FreeTextEntry1] : At this time I have discussed C5-6 TDR/possible ACDF with Ms. Anderson for her concordant pain refractory to all manners ofconservative management. The risk benefits/alternative of C5-6 TDR were discussed with possible conversion to fusion if needed including but not limited to bleeding, infection, CSF leak, spinal cord injury, recurrent laryngeal nerve injury, esophageal injury, injury to great vessels of neck, pseudoarthroses, hardware failure, adjacent level disease. She would like to proceed.\par \par I am requesting updated MRI as hers is more than 1 year old for surgical planning as well as cervical flexion/extension X-rays to ensure no gross instability which would necessitate conversion to fusion. 
no

## 2022-10-19 ENCOUNTER — RX RENEWAL (OUTPATIENT)
Age: 62
End: 2022-10-19

## 2022-11-18 ENCOUNTER — RX RENEWAL (OUTPATIENT)
Age: 62
End: 2022-11-18

## 2022-11-21 ENCOUNTER — APPOINTMENT (OUTPATIENT)
Dept: PLASTIC SURGERY | Facility: CLINIC | Age: 62
End: 2022-11-21

## 2022-11-21 VITALS — BODY MASS INDEX: 37.49 KG/M2 | WEIGHT: 225 LBS | HEIGHT: 65 IN

## 2022-11-21 PROCEDURE — 99214 OFFICE O/P EST MOD 30 MIN: CPT

## 2022-11-25 ENCOUNTER — RX RENEWAL (OUTPATIENT)
Age: 62
End: 2022-11-25

## 2022-11-28 NOTE — PHYSICAL EXAM
[de-identified] : NAD, AxOx3 [de-identified] : soft, there is an overhanging pannus extending to below the mons pubis.  +striae. There is upper abdominal lipodystrophy.  There are several small open wounds to beneath her pannus again noted.  There is no evidence of infection. +diastasis recti and ? umbilical hernia? [de-identified] : no edema  [de-identified] : as above  [de-identified] : normal affect

## 2022-11-28 NOTE — HISTORY OF PRESENT ILLNESS
[FreeTextEntry1] : Yaima Chairez is a 63 y/o female s/p bilateral breast reduction on 7/19/22. Patient has no complaints and pleased with her results.  She presents today for discussion concerning panniculectomy/abdominoplasty.  Patient states she has lost 5 lbs.

## 2022-12-05 ENCOUNTER — APPOINTMENT (OUTPATIENT)
Dept: CT IMAGING | Facility: CLINIC | Age: 62
End: 2022-12-05
Payer: COMMERCIAL

## 2022-12-05 ENCOUNTER — OUTPATIENT (OUTPATIENT)
Dept: OUTPATIENT SERVICES | Facility: HOSPITAL | Age: 62
LOS: 1 days | End: 2022-12-05
Payer: COMMERCIAL

## 2022-12-05 DIAGNOSIS — E65 LOCALIZED ADIPOSITY: ICD-10-CM

## 2022-12-05 DIAGNOSIS — Z98.42 CATARACT EXTRACTION STATUS, LEFT EYE: Chronic | ICD-10-CM

## 2022-12-05 DIAGNOSIS — Z90.3 ACQUIRED ABSENCE OF STOMACH [PART OF]: Chronic | ICD-10-CM

## 2022-12-05 DIAGNOSIS — R63.4 ABNORMAL WEIGHT LOSS: ICD-10-CM

## 2022-12-05 DIAGNOSIS — Z98.890 OTHER SPECIFIED POSTPROCEDURAL STATES: Chronic | ICD-10-CM

## 2022-12-05 DIAGNOSIS — Z98.84 BARIATRIC SURGERY STATUS: Chronic | ICD-10-CM

## 2022-12-05 DIAGNOSIS — Z96.652 PRESENCE OF LEFT ARTIFICIAL KNEE JOINT: Chronic | ICD-10-CM

## 2022-12-05 DIAGNOSIS — Z98.89 OTHER SPECIFIED POSTPROCEDURAL STATES: Chronic | ICD-10-CM

## 2022-12-05 PROCEDURE — 74176 CT ABD & PELVIS W/O CONTRAST: CPT | Mod: 26

## 2022-12-05 PROCEDURE — 74176 CT ABD & PELVIS W/O CONTRAST: CPT

## 2023-02-13 NOTE — H&P PST ADULT - NSANTHTIREDRD_ENT_A_CORE
[facility]  310/01  HOSPITAL MEDICINE PROGRESS NOTE   Patient: Edith Veras  Today's Date: 2/13/2023    YOB: 1948  Admission Date: 2/11/2023    MRN: [id number]  Inpatient LOS: 0    Attending: Fabian Herrera MD  Hospital Day: Hospital Day: 3    Subjective   HISTORY AND SUBJECTIVE COMPLAINTS     Chief Complaint:   Fall, 9 days prior to admission   Left forearm pain   Left hip pain   Moderately displaced, slightly impacted likely subacute subcapital left femoral neck fracture on CT  Mildly displaced and impacted fracture through the left ulnar distal diaphysis on xr  Difficulty ambulating    Interval History / Subjective:   No acute events overnight.  Patient seen and examined.  She reports intermittent worsening of pain with movement but is tolerable with current regimen of Tylenol plus gabapentin, she is intolerant to multiple other lead medication in refuse topical lidocaine, tramadol, narcotics.  She denies any other new concerns.  Denies any Fever, Chest pain, Shortness of breath, Cough, Abdominal pain, Nausea, Vomiting and  symptoms.     Hospital Course:  Edith Veras is a 74 year old female who presented on 2/11/2023 with complaints of Fall  She has a past medical history of scleroderma, hypertension, hyperlipidemia, history of right breast cancer status post surgery, fibromyalgia with chronic fatigue syndrome.  Patient was admitted on 02/11/2020 daughters presented with progressively worsening left forearm, left hip pain that was unbearable with difficulty ambulating since a fall that happened 9 days prior to admission.  X-ray revealed Mildly displaced and impacted fracture through the left ulnar distal diaphysis.  Also note made of Moderately displaced, slightly impacted likely subacute subcapital left femoral neck fracture confirmed on CT.  Ortho consulted with recommendations for conservative management.  PT/OT consulted, awaiting assessment, will likely need  placement at subacute rehab.    Review of systems:    All other systems are reviewed and are negative except as documented in the history of present illness.    Medications:  Scheduled:  sodium chloride (PF), 2 mL, 2 times per day  [Held by provider] enoxaparin, 40 mg, Daily  Potassium Standard Replacement Protocol (Levels 3.5 and lower), , See Admin Instructions  Magnesium Standard Replacement Protocol, , See Admin Instructions  anastrozole, 1 mg, Daily  [Held by provider] atorvastatin, 20 mg, Daily  cetirizine, 10 mg, Daily  FLUoxetine, 40 mg, Daily  folic acid, 2 mg, Daily  tiZANidine, 1 mg, BID  gabapentin, 100 mg, Daily  gabapentin, 300 mg, Nightly      PRN:  sodium chloride, 500 mL, PRN  sodium chloride, 25 mL, PRN  sodium chloride, 25 mL, PRN  acetaminophen, 650 mg, Q6H PRN  polyethylene glycol, 17 g, Daily PRN  docusate sodium-sennosides, 2 tablet, Daily PRN  bisacodyl, 10 mg, Daily PRN  meclizine, 25 mg, TID PRN  hydrALAZINE, 10 mg, Q6H PRN          Infusions:   Current Facility-Administered Medications   Medication Dose Route Frequency Provider Last Rate Last Admin     Objective   PHYSICAL EXAMINATION     Vital 24 Hour Range Most Recent Value   Temperature Temp  Min: 98.3 °F (36.8 °C)  Max: 99.1 °F (37.3 °C) 98.6 °F (37 °C)   Pulse Pulse  Min: 66  Max: 74 66   Respiratory Resp  Min: 18  Max: 18 18   Blood Pressure BP  Min: 132/72  Max: 158/76 132/72   Pulse Oximetry SpO2  Min: 94 %  Max: 96 % 96 %   Arterial BP No data recorded     O2 No data recorded       Recorded Intake and Output:No intake or output data in the 24 hours ending 02/13/23 1126   Recorded Last Stool Occurrence: 1 (02/11/23 2203)     Vital Most Recent Value First Value   Weight 62.9 kg (138 lb 10.7 oz) Weight: 63.5 kg (140 lb)   Height 5' 4\" (162.6 cm) Height: 5' 4\" (162.6 cm)   BMI 23.8 N/A   General: Looks well, no apparent distress and well developed, well nourished  Neck: Supple  HEENT: moist Oral Mucosa, pupils equally round and  reactive to light, sclerae are anicteric and EOM intact  CV: regular rate and rhythm, S1, S2 normal,  and Dorsalis pedis pulses palpable  Resp: clear to auscultation bilaterally and no accessory muscle use   Abd: soft, nontender, nondistended and bowel sounds present  Ext: palpable Dorsalis pedis pulses and moving all 4 extremities spontaneously   + subcutaneous bruising around the left greater trochanter in various stages of resolution  + subcutaneous bruising of the left arm in various stages of resolution.  Left forearm in cast   Radial pulses, dorsalis pedis pulses intact bilaterally.  Sensations intact to gross touch  Skin: no rashes, lesions, or ulcers noted and warm to touch  Neuro: CN 2-12 grossly intact, Sensation to gross touch intact and moving all extremities spontaneously  Psych: oriented to time, place and person, normal judgement and insight and normal mood and affect    TEST RESULTS     Labs: The Laboratory values have been reviewed and pertinent findings discussed in the Assessment and Plan.  CBC  Recent Labs   Lab 02/13/23 0508 02/12/23 0427 02/11/23  1746   WBC 5.7 7.3 8.5   HCT 39.0 35.2* 41.7   HGB 12.7 11.9* 13.6   MCV 91.5 89.8 91.6    156 189           Invalid input(s): HEMOCCULTG  Metabolic Panel:  Recent Labs   Lab 02/13/23  0508 02/12/23  0427 02/11/23 1923 02/11/23  1746   SODIUM 137 141  --  139   POTASSIUM 3.9 4.1  --  4.2   CHLORIDE 108 110  --  106   CO2 24 25  --  27   CALCIUM 9.1 8.6  --  9.1   GLUCOSE 86 96  --  88   BUN 18 19  --  19   CREATININE 0.80 0.89  --  0.89   AST 66*  --  44*  --    GPT 65*  --  44  --    ALKPT 151*  --  148*  --    BILIRUBIN 1.0  --  1.1*  --    ALBUMIN 3.0*  --  3.4*  --      No results found for: LACTA   Glucometer Checks    No results available in last 24 hours  Hemoglobin A1C (%)   Date Value   07/06/2022 5.2     TSH (mcUnits/mL)   Date Value   07/06/2022 3.477   06/24/2021 2.751   09/11/2020 2.356      COVID-19:  WDL:       Rapid:  Recent  Labs     09/29/22 1953   [id number] Not Detected     Urinalysis:  No results found     Microbiology:    Blood Cultures:  No results found for: BLC    Urine Cultures:  Lab Results   Component Value Date      09/30/2022     10,000 - 50,000 CFU/mL Mixed bacterial sarah with no predominating type    UC  09/29/2022     10,000 - 50,000 CFU/mL Mixed bacterial sarah with no predominating type         Wound culture:   No results found for: ROCS     Imaging Studies:    Results for orders placed or performed during the hospital encounter of 02/11/23 (from the past 72 hour(s))   XR Forearm 2 View Left    Impression    IMPRESSION:    1.  Suspected subcapital left femur fracture, likely chronic.  2.  Mildly displaced and impacted fracture through the left ulnar distal  diaphysis.  3.  Moderate right and severe left hip osteoarthritis.   XR Hip Left AP and Lateral    Impression    IMPRESSION:    1.  Suspected subcapital left femur fracture, likely chronic.  2.  Mildly displaced and impacted fracture through the left ulnar distal  diaphysis.  3.  Moderate right and severe left hip osteoarthritis.   XR Pelvis 1 View    Impression    IMPRESSION:    1.  Suspected subcapital left femur fracture, likely chronic.  2.  Mildly displaced and impacted fracture through the left ulnar distal  diaphysis.  3.  Moderate right and severe left hip osteoarthritis.   XR Ribs Left 2 Vw    Impression    IMPRESSION:    1.  Suspected subcapital left femur fracture, likely chronic.  2.  Mildly displaced and impacted fracture through the left ulnar distal  diaphysis.  3.  Moderate right and severe left hip osteoarthritis.   CT Hip Left    Addendum: 2/12/2023          The fracture is only minimally displaced.      Impression    IMPRESSION:    Subcapital femoral neck fracture      Ejection Fraction   Date Value Ref Range Status   01/06/2023 72 % Final          Cardiac Investigations:   Results for orders placed or performed during the hospital encounter  of 09/29/22   Electrocardiogram 12-Lead   Result Value Ref Range    Ventricular Rate EKG/Min (BPM) 68     Atrial Rate (BPM) 68     OK-Interval (MSEC) 154     QRS-Interval (MSEC) 64     QT-Interval (MSEC) 406     QTc 432     P Axis (Degrees) 55     R Axis (Degrees) 7     T Axis (Degrees) 66     REPORT TEXT       Normal sinus rhythm  Nonspecific ST and T wave abnormality  Abnormal ECG  No previous ECGs available  Confirmed by MD EDDIE, CON VERAS ([id number]),  Janelle Diaz ([id number]) on 10/5/2022 9:24:54 AM          Radiology: Imaging studies have been reviewed and pertinent findings discussed in the Assessment and Plan.  Results for orders placed or performed during the hospital encounter of 02/11/23 (from the past 48 hour(s))   XR Forearm 2 View Left    Impression    IMPRESSION:    1.  Suspected subcapital left femur fracture, likely chronic.  2.  Mildly displaced and impacted fracture through the left ulnar distal  diaphysis.  3.  Moderate right and severe left hip osteoarthritis.   XR Hip Left AP and Lateral    Impression    IMPRESSION:    1.  Suspected subcapital left femur fracture, likely chronic.  2.  Mildly displaced and impacted fracture through the left ulnar distal  diaphysis.  3.  Moderate right and severe left hip osteoarthritis.   XR Pelvis 1 View    Impression    IMPRESSION:    1.  Suspected subcapital left femur fracture, likely chronic.  2.  Mildly displaced and impacted fracture through the left ulnar distal  diaphysis.  3.  Moderate right and severe left hip osteoarthritis.   XR Ribs Left 2 Vw    Impression    IMPRESSION:    1.  Suspected subcapital left femur fracture, likely chronic.  2.  Mildly displaced and impacted fracture through the left ulnar distal  diaphysis.  3.  Moderate right and severe left hip osteoarthritis.   CT Hip Left    Addendum: 2/12/2023          The fracture is only minimally displaced.      Impression    IMPRESSION:    Subcapital femoral neck fracture        ANCILLARY ORDERS      Diet:  Regular Diet  Telemetry: On  Consults:    IP CONSULT TO ORTHOPEDIC SURGERY  IP CONSULT TO SOCIAL WORK  Therapy Orders:   PT and OT Orders Placed this Encounter   Procedures   • Occupational Therapy   • Occupational Therapy   • Physical Therapy   • Physical Therapy       ADVANCED DIRECTIVES     Code Status: Full Resuscitation           ASSESSMENT AND PLAN     Mechanical fall at home.  Left forearm, left rib and left hip pain  Unable to ambulate safely, recently using a walker  CT revealed moderately displaced, slightly impacted likely subacute  subcapital femoral neck fracture eft  Xr revealed mildly displaced and impacted fracture through the left ulnar distal diaphysis.  Moderate right and severe left hip osteoarthritis.  Orthopedic consulted from ER, no immediate surgical intervention   Forearm splint for left ulnar fracture to remain in place at all times   Immobilization of left lower extremity with weight-bearing 50% only while transferring from bed to chair.  Continue current pain management   PT/OT assessment pending, social work consulted for discharge planning  Continue home dose of gabapentin, tylenol for pain control  Patient reported allergic reaction to' pollo' family or drugs and different types of side effects/intolerance is to tramadol, narcotics.    Abnormal LFTs  hold statin, stable  Monitor labs     Hypertension  Monitor,  Not currently on medications at home     Anxiety depression   Continue home prozac     Scleroderma  Hold methotrexate  Continue home dose of gabapentin, tizanidine  not on chronic sterids    Normocytic anemia, not POA  Likely dilution given decline in all 3 cell lines   Resolved,  Monitor     Code status-full code.     Smoking status: non smoker    Body mass index is 23.8 kg/m².  DVT Prophylaxis:  Prophylactic dose of Lovenox.  Discussed with ortho need for DVT prophylaxis at discharge if she is nonweightbearing.        DISCHARGE PLANNING     The patient's treatment  plans were discussed with patient and RN.    Discharge Planning     Barriers to discharge: Pending Identification of a bed at an appropriate post-acute facility and/or Insurance authorization for the post-acute bed.  Anticipated discharge destination: pent pt/ot eval , possible [facility]  Expected Discharge Date: SAHIL Herrera MD  Hospitalist  2/13/2023  11:20 AM         No

## 2023-02-14 ENCOUNTER — APPOINTMENT (OUTPATIENT)
Dept: BARIATRICS | Facility: CLINIC | Age: 63
End: 2023-02-14

## 2023-02-14 NOTE — H&P PST ADULT - LIVING CHILDREN, OB PROFILE
4 Nsaids Counseling: NSAID Counseling: I discussed with the patient that NSAIDs should be taken with food. Prolonged use of NSAIDs can result in the development of stomach ulcers.  Patient advised to stop taking NSAIDs if abdominal pain occurs.  The patient verbalized understanding of the proper use and possible adverse effects of NSAIDs.  All of the patient's questions and concerns were addressed.

## 2023-03-10 ENCOUNTER — OUTPATIENT (OUTPATIENT)
Dept: OUTPATIENT SERVICES | Facility: HOSPITAL | Age: 63
LOS: 1 days | End: 2023-03-10
Payer: COMMERCIAL

## 2023-03-10 VITALS
DIASTOLIC BLOOD PRESSURE: 81 MMHG | OXYGEN SATURATION: 100 % | RESPIRATION RATE: 16 BRPM | TEMPERATURE: 99 F | WEIGHT: 240.74 LBS | SYSTOLIC BLOOD PRESSURE: 126 MMHG | HEART RATE: 84 BPM | HEIGHT: 66 IN

## 2023-03-10 DIAGNOSIS — E65 LOCALIZED ADIPOSITY: ICD-10-CM

## 2023-03-10 DIAGNOSIS — Z90.3 ACQUIRED ABSENCE OF STOMACH [PART OF]: Chronic | ICD-10-CM

## 2023-03-10 DIAGNOSIS — Z98.42 CATARACT EXTRACTION STATUS, LEFT EYE: Chronic | ICD-10-CM

## 2023-03-10 DIAGNOSIS — Z98.89 OTHER SPECIFIED POSTPROCEDURAL STATES: Chronic | ICD-10-CM

## 2023-03-10 DIAGNOSIS — Z98.890 OTHER SPECIFIED POSTPROCEDURAL STATES: Chronic | ICD-10-CM

## 2023-03-10 DIAGNOSIS — Z98.84 BARIATRIC SURGERY STATUS: Chronic | ICD-10-CM

## 2023-03-10 DIAGNOSIS — I10 ESSENTIAL (PRIMARY) HYPERTENSION: ICD-10-CM

## 2023-03-10 DIAGNOSIS — Z96.652 PRESENCE OF LEFT ARTIFICIAL KNEE JOINT: Chronic | ICD-10-CM

## 2023-03-10 DIAGNOSIS — Z01.818 ENCOUNTER FOR OTHER PREPROCEDURAL EXAMINATION: ICD-10-CM

## 2023-03-10 LAB
ANION GAP SERPL CALC-SCNC: 6 MMOL/L — SIGNIFICANT CHANGE UP (ref 5–17)
BLD GP AB SCN SERPL QL: SIGNIFICANT CHANGE UP
BUN SERPL-MCNC: 11 MG/DL — SIGNIFICANT CHANGE UP (ref 7–23)
CALCIUM SERPL-MCNC: 9.1 MG/DL — SIGNIFICANT CHANGE UP (ref 8.4–10.5)
CHLORIDE SERPL-SCNC: 103 MMOL/L — SIGNIFICANT CHANGE UP (ref 96–108)
CO2 SERPL-SCNC: 30 MMOL/L — SIGNIFICANT CHANGE UP (ref 22–31)
CREAT SERPL-MCNC: 1.02 MG/DL — SIGNIFICANT CHANGE UP (ref 0.5–1.3)
EGFR: 62 ML/MIN/1.73M2 — SIGNIFICANT CHANGE UP
GLUCOSE SERPL-MCNC: 76 MG/DL — SIGNIFICANT CHANGE UP (ref 70–99)
HCT VFR BLD CALC: 36.4 % — SIGNIFICANT CHANGE UP (ref 34.5–45)
HGB BLD-MCNC: 11.6 G/DL — SIGNIFICANT CHANGE UP (ref 11.5–15.5)
MCHC RBC-ENTMCNC: 26.6 PG — LOW (ref 27–34)
MCHC RBC-ENTMCNC: 31.9 GM/DL — LOW (ref 32–36)
MCV RBC AUTO: 83.5 FL — SIGNIFICANT CHANGE UP (ref 80–100)
NRBC # BLD: 0 /100 WBCS — SIGNIFICANT CHANGE UP (ref 0–0)
PLATELET # BLD AUTO: 239 K/UL — SIGNIFICANT CHANGE UP (ref 150–400)
POTASSIUM SERPL-MCNC: 3.6 MMOL/L — SIGNIFICANT CHANGE UP (ref 3.5–5.3)
POTASSIUM SERPL-SCNC: 3.6 MMOL/L — SIGNIFICANT CHANGE UP (ref 3.5–5.3)
RBC # BLD: 4.36 M/UL — SIGNIFICANT CHANGE UP (ref 3.8–5.2)
RBC # FLD: 14.8 % — HIGH (ref 10.3–14.5)
SARS-COV-2 RNA SPEC QL NAA+PROBE: SIGNIFICANT CHANGE UP
SODIUM SERPL-SCNC: 139 MMOL/L — SIGNIFICANT CHANGE UP (ref 135–145)
WBC # BLD: 6.57 K/UL — SIGNIFICANT CHANGE UP (ref 3.8–10.5)
WBC # FLD AUTO: 6.57 K/UL — SIGNIFICANT CHANGE UP (ref 3.8–10.5)

## 2023-03-10 PROCEDURE — 36415 COLL VENOUS BLD VENIPUNCTURE: CPT

## 2023-03-10 PROCEDURE — 86850 RBC ANTIBODY SCREEN: CPT

## 2023-03-10 PROCEDURE — 93005 ELECTROCARDIOGRAM TRACING: CPT

## 2023-03-10 PROCEDURE — 80048 BASIC METABOLIC PNL TOTAL CA: CPT

## 2023-03-10 PROCEDURE — 93010 ELECTROCARDIOGRAM REPORT: CPT | Mod: NC

## 2023-03-10 PROCEDURE — 86901 BLOOD TYPING SEROLOGIC RH(D): CPT

## 2023-03-10 PROCEDURE — 87635 SARS-COV-2 COVID-19 AMP PRB: CPT

## 2023-03-10 PROCEDURE — G0463: CPT

## 2023-03-10 PROCEDURE — 85027 COMPLETE CBC AUTOMATED: CPT

## 2023-03-10 PROCEDURE — 86900 BLOOD TYPING SEROLOGIC ABO: CPT

## 2023-03-10 NOTE — H&P PST ADULT - COMMENTS
Denies h/o or family h/o DVT, PE  Denies bleeding or clotting disorders  Denies dentures/partials, loose teeth/caps

## 2023-03-10 NOTE — H&P PST ADULT - NSICDXPASTSURGICALHX_GEN_ALL_CORE_FT
PAST SURGICAL HISTORY:  H/O gastric sleeve May 2021    H/O total knee replacement, left 2015    History of removal of laparoscopic gastric banding device 2021    S/P cataract surgery, left     S/P  section     S/P hernia surgery 2021    Status post gastric banding 2005

## 2023-03-10 NOTE — H&P PST ADULT - PROBLEM SELECTOR PLAN 1
Patient provided with pre-operative instructions and verbalized understanding.  Patient will be NPO on day of surgery. Patient will stop NSAIDs, aspirin, herbal supplements or vitamins 1 week prior to surgery.  Chlorhexidine wash provided with instructions.  Pending medical clearance as per surgeon.  COVID PCR pending per policy.

## 2023-03-10 NOTE — H&P PST ADULT - HISTORY OF PRESENT ILLNESS
61 yo female with medical h/o HTN, HDL and Anxiety, presents to PST for scheduled panniculectomy and abdominoplasty on 3/15/23.

## 2023-03-10 NOTE — H&P PST ADULT - NSICDXPASTMEDICALHX_GEN_ALL_CORE_FT
PAST MEDICAL HISTORY:  Anxiety     COVID-19 vaccine series completed     GERD (gastroesophageal reflux disease)     HTN (hypertension)     Hypercholesterolemia     Localized adiposity     Morbid obesity with BMI of 40.0-44.9, adult

## 2023-03-13 ENCOUNTER — APPOINTMENT (OUTPATIENT)
Dept: INTERNAL MEDICINE | Facility: CLINIC | Age: 63
End: 2023-03-13
Payer: COMMERCIAL

## 2023-03-13 VITALS
HEIGHT: 65 IN | OXYGEN SATURATION: 98 % | WEIGHT: 225 LBS | DIASTOLIC BLOOD PRESSURE: 76 MMHG | HEART RATE: 98 BPM | TEMPERATURE: 98 F | SYSTOLIC BLOOD PRESSURE: 124 MMHG | RESPIRATION RATE: 14 BRPM | BODY MASS INDEX: 37.49 KG/M2

## 2023-03-13 DIAGNOSIS — F41.9 ANXIETY DISORDER, UNSPECIFIED: ICD-10-CM

## 2023-03-13 DIAGNOSIS — I10 ESSENTIAL (PRIMARY) HYPERTENSION: ICD-10-CM

## 2023-03-13 DIAGNOSIS — Z01.818 ENCOUNTER FOR OTHER PREPROCEDURAL EXAMINATION: ICD-10-CM

## 2023-03-13 DIAGNOSIS — E66.9 OBESITY, UNSPECIFIED: ICD-10-CM

## 2023-03-13 PROCEDURE — 99214 OFFICE O/P EST MOD 30 MIN: CPT

## 2023-03-13 RX ORDER — CEFADROXIL 500 MG/1
500 CAPSULE ORAL TWICE DAILY
Qty: 14 | Refills: 0 | Status: DISCONTINUED | COMMUNITY
Start: 2022-07-18 | End: 2023-03-13

## 2023-03-13 RX ORDER — ONDANSETRON 4 MG/1
4 TABLET, ORALLY DISINTEGRATING ORAL
Qty: 9 | Refills: 0 | Status: DISCONTINUED | COMMUNITY
Start: 2022-07-18 | End: 2023-03-13

## 2023-03-13 RX ORDER — OXYCODONE AND ACETAMINOPHEN 5; 325 MG/1; MG/1
5-325 TABLET ORAL
Qty: 24 | Refills: 0 | Status: DISCONTINUED | COMMUNITY
Start: 2022-07-18 | End: 2023-03-13

## 2023-03-13 RX ORDER — OXYCODONE AND ACETAMINOPHEN 5; 325 MG/1; MG/1
5-325 TABLET ORAL
Qty: 10 | Refills: 0 | Status: DISCONTINUED | COMMUNITY
Start: 2022-08-01 | End: 2023-03-13

## 2023-03-13 NOTE — HISTORY OF PRESENT ILLNESS
[No Pertinent Cardiac History] : no history of aortic stenosis, atrial fibrillation, coronary artery disease, recent myocardial infarction, or implantable device/pacemaker [No Pertinent Pulmonary History] : no history of asthma, COPD, sleep apnea, or smoking [No Adverse Anesthesia Reaction] : no adverse anesthesia reaction in self or family member [(Patient denies any chest pain, claudication, dyspnea on exertion, orthopnea, palpitations or syncope)] : Patient denies any chest pain, claudication, dyspnea on exertion, orthopnea, palpitations or syncope [Sleep Apnea] : no sleep apnea [Chronic Anticoagulation] : no chronic anticoagulation [Chronic Kidney Disease] : no chronic kidney disease [Diabetes] : no diabetes [FreeTextEntry1] : abdominoplasty [FreeTextEntry2] : 3/15/23 [FreeTextEntry3] : Dr Calderón [FreeTextEntry4] : Pt is having surgery on 3/15 for removal skin from weight loss. \par She has HTN, hyperlipidemia, anxiety. \par h/o gastric sleeve surgery.

## 2023-03-13 NOTE — ASSESSMENT
[Patient Optimized for Surgery] : Patient optimized for surgery [FreeTextEntry4] : HTN\par well controlled\par \par anxiety\par sertraline\par \par hyperlipidemia\par rosuvastatin\par

## 2023-03-14 ENCOUNTER — TRANSCRIPTION ENCOUNTER (OUTPATIENT)
Age: 63
End: 2023-03-14

## 2023-03-15 ENCOUNTER — APPOINTMENT (OUTPATIENT)
Dept: PLASTIC SURGERY | Facility: HOSPITAL | Age: 63
End: 2023-03-15
Payer: SELF-PAY

## 2023-03-15 ENCOUNTER — TRANSCRIPTION ENCOUNTER (OUTPATIENT)
Age: 63
End: 2023-03-15

## 2023-03-15 ENCOUNTER — OUTPATIENT (OUTPATIENT)
Dept: OUTPATIENT SERVICES | Facility: HOSPITAL | Age: 63
LOS: 1 days | End: 2023-03-15
Payer: COMMERCIAL

## 2023-03-15 ENCOUNTER — APPOINTMENT (OUTPATIENT)
Dept: PLASTIC SURGERY | Facility: HOSPITAL | Age: 63
End: 2023-03-15

## 2023-03-15 VITALS
OXYGEN SATURATION: 100 % | WEIGHT: 240.74 LBS | SYSTOLIC BLOOD PRESSURE: 131 MMHG | TEMPERATURE: 98 F | HEIGHT: 66 IN | RESPIRATION RATE: 15 BRPM | DIASTOLIC BLOOD PRESSURE: 84 MMHG | HEART RATE: 70 BPM

## 2023-03-15 DIAGNOSIS — Z98.890 OTHER SPECIFIED POSTPROCEDURAL STATES: Chronic | ICD-10-CM

## 2023-03-15 DIAGNOSIS — E65 LOCALIZED ADIPOSITY: ICD-10-CM

## 2023-03-15 DIAGNOSIS — Z96.652 PRESENCE OF LEFT ARTIFICIAL KNEE JOINT: Chronic | ICD-10-CM

## 2023-03-15 DIAGNOSIS — Z98.89 OTHER SPECIFIED POSTPROCEDURAL STATES: Chronic | ICD-10-CM

## 2023-03-15 DIAGNOSIS — Z98.84 BARIATRIC SURGERY STATUS: Chronic | ICD-10-CM

## 2023-03-15 DIAGNOSIS — Z90.3 ACQUIRED ABSENCE OF STOMACH [PART OF]: Chronic | ICD-10-CM

## 2023-03-15 DIAGNOSIS — Z98.42 CATARACT EXTRACTION STATUS, LEFT EYE: Chronic | ICD-10-CM

## 2023-03-15 PROCEDURE — 15847 EXC SKIN ABD ADD-ON: CPT

## 2023-03-15 PROCEDURE — 15847 EXC SKIN ABD ADD-ON: CPT | Mod: NC

## 2023-03-15 PROCEDURE — 15830 EXC EXCESSIVE SKIN ABDOMEN: CPT

## 2023-03-15 DEVICE — VISTASEAL FIBRIN HUMAN 4ML: Type: IMPLANTABLE DEVICE | Status: FUNCTIONAL

## 2023-03-15 DEVICE — CLIP APPLIER COVIDIEN SURGICLIP 11.5" MEDIUM: Type: IMPLANTABLE DEVICE | Status: FUNCTIONAL

## 2023-03-15 RX ORDER — ATORVASTATIN CALCIUM 80 MG/1
20 TABLET, FILM COATED ORAL AT BEDTIME
Refills: 0 | Status: DISCONTINUED | OUTPATIENT
Start: 2023-03-15 | End: 2023-03-29

## 2023-03-15 RX ORDER — CEFAZOLIN SODIUM 1 G
1000 VIAL (EA) INJECTION EVERY 8 HOURS
Refills: 0 | Status: DISCONTINUED | OUTPATIENT
Start: 2023-03-16 | End: 2023-03-29

## 2023-03-15 RX ORDER — ROSUVASTATIN CALCIUM 5 MG/1
1 TABLET ORAL
Qty: 0 | Refills: 0 | DISCHARGE

## 2023-03-15 RX ORDER — SODIUM CHLORIDE 9 MG/ML
1000 INJECTION, SOLUTION INTRAVENOUS
Refills: 0 | Status: DISCONTINUED | OUTPATIENT
Start: 2023-03-15 | End: 2023-03-15

## 2023-03-15 RX ORDER — VALSARTAN 80 MG/1
80 TABLET ORAL DAILY
Refills: 0 | Status: DISCONTINUED | OUTPATIENT
Start: 2023-03-15 | End: 2023-03-29

## 2023-03-15 RX ORDER — OXYCODONE HYDROCHLORIDE 5 MG/1
10 TABLET ORAL EVERY 4 HOURS
Refills: 0 | Status: DISCONTINUED | OUTPATIENT
Start: 2023-03-15 | End: 2023-03-16

## 2023-03-15 RX ORDER — SODIUM CHLORIDE 9 MG/ML
1000 INJECTION, SOLUTION INTRAVENOUS
Refills: 0 | Status: DISCONTINUED | OUTPATIENT
Start: 2023-03-15 | End: 2023-03-29

## 2023-03-15 RX ORDER — OXYCODONE HYDROCHLORIDE 5 MG/1
5 TABLET ORAL EVERY 4 HOURS
Refills: 0 | Status: DISCONTINUED | OUTPATIENT
Start: 2023-03-15 | End: 2023-03-16

## 2023-03-15 RX ORDER — ONDANSETRON 8 MG/1
4 TABLET, FILM COATED ORAL ONCE
Refills: 0 | Status: DISCONTINUED | OUTPATIENT
Start: 2023-03-15 | End: 2023-03-15

## 2023-03-15 RX ORDER — SERTRALINE 25 MG/1
50 TABLET, FILM COATED ORAL DAILY
Refills: 0 | Status: DISCONTINUED | OUTPATIENT
Start: 2023-03-15 | End: 2023-03-29

## 2023-03-15 RX ORDER — DIAZEPAM 5 MG
5 TABLET ORAL EVERY 8 HOURS
Refills: 0 | Status: DISCONTINUED | OUTPATIENT
Start: 2023-03-15 | End: 2023-03-15

## 2023-03-15 RX ORDER — METOCLOPRAMIDE HCL 10 MG
10 TABLET ORAL EVERY 6 HOURS
Refills: 0 | Status: DISCONTINUED | OUTPATIENT
Start: 2023-03-15 | End: 2023-03-29

## 2023-03-15 RX ORDER — ACETAMINOPHEN 500 MG
650 TABLET ORAL EVERY 6 HOURS
Refills: 0 | Status: DISCONTINUED | OUTPATIENT
Start: 2023-03-15 | End: 2023-03-29

## 2023-03-15 RX ORDER — SERTRALINE 25 MG/1
0 TABLET, FILM COATED ORAL
Qty: 0 | Refills: 0 | DISCHARGE

## 2023-03-15 RX ORDER — SERTRALINE 25 MG/1
100 TABLET, FILM COATED ORAL DAILY
Refills: 0 | Status: DISCONTINUED | OUTPATIENT
Start: 2023-03-15 | End: 2023-03-29

## 2023-03-15 RX ORDER — HYDROMORPHONE HYDROCHLORIDE 2 MG/ML
0.5 INJECTION INTRAMUSCULAR; INTRAVENOUS; SUBCUTANEOUS
Refills: 0 | Status: DISCONTINUED | OUTPATIENT
Start: 2023-03-15 | End: 2023-03-15

## 2023-03-15 RX ORDER — ONDANSETRON 8 MG/1
4 TABLET, FILM COATED ORAL EVERY 6 HOURS
Refills: 0 | Status: DISCONTINUED | OUTPATIENT
Start: 2023-03-15 | End: 2023-03-29

## 2023-03-15 RX ORDER — HYDROMORPHONE HYDROCHLORIDE 2 MG/ML
1 INJECTION INTRAMUSCULAR; INTRAVENOUS; SUBCUTANEOUS
Refills: 0 | Status: DISCONTINUED | OUTPATIENT
Start: 2023-03-15 | End: 2023-03-15

## 2023-03-15 RX ADMIN — OXYCODONE HYDROCHLORIDE 5 MILLIGRAM(S): 5 TABLET ORAL at 21:58

## 2023-03-15 RX ADMIN — SODIUM CHLORIDE 50 MILLILITER(S): 9 INJECTION, SOLUTION INTRAVENOUS at 11:32

## 2023-03-15 RX ADMIN — HYDROMORPHONE HYDROCHLORIDE 1 MILLIGRAM(S): 2 INJECTION INTRAMUSCULAR; INTRAVENOUS; SUBCUTANEOUS at 18:12

## 2023-03-15 RX ADMIN — OXYCODONE HYDROCHLORIDE 5 MILLIGRAM(S): 5 TABLET ORAL at 22:28

## 2023-03-15 RX ADMIN — HYDROMORPHONE HYDROCHLORIDE 0.5 MILLIGRAM(S): 2 INJECTION INTRAMUSCULAR; INTRAVENOUS; SUBCUTANEOUS at 17:47

## 2023-03-15 RX ADMIN — SERTRALINE 100 MILLIGRAM(S): 25 TABLET, FILM COATED ORAL at 21:58

## 2023-03-15 RX ADMIN — HYDROMORPHONE HYDROCHLORIDE 1 MILLIGRAM(S): 2 INJECTION INTRAMUSCULAR; INTRAVENOUS; SUBCUTANEOUS at 17:57

## 2023-03-15 RX ADMIN — ATORVASTATIN CALCIUM 20 MILLIGRAM(S): 80 TABLET, FILM COATED ORAL at 21:58

## 2023-03-15 RX ADMIN — HYDROMORPHONE HYDROCHLORIDE 0.5 MILLIGRAM(S): 2 INJECTION INTRAMUSCULAR; INTRAVENOUS; SUBCUTANEOUS at 17:32

## 2023-03-15 NOTE — ASU DISCHARGE PLAN (ADULT/PEDIATRIC) - CARE PROVIDER_API CALL
ShikowitzBehr, Lauren B (MD)  Plastic Surgery  [address] [place] [address]  Phone: [phone]  Fax: [phone]  Follow Up Time:

## 2023-03-15 NOTE — ASU DISCHARGE PLAN (ADULT/PEDIATRIC) - ASU DC SPECIAL INSTRUCTIONSFT
Keep dressings and incisions clean and dry until seen in the office  Sponge bathe only  Empty and record drain outputs twice a day  No heavy lifting or straining  Ambulate often
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
## 2023-03-15 NOTE — ASU PATIENT PROFILE, ADULT - NSICDXPASTSURGICALHX_GEN_ALL_CORE_FT
PAST SURGICAL HISTORY:  H/O gastric sleeve May 2021    H/O total knee replacement, left 2015    History of removal of laparoscopic gastric banding device 2021    S/P cataract surgery, left     S/P  section     S/P hernia surgery 2021    Status post gastric banding 2005     PAST SURGICAL HISTORY:  H/O bilateral breast reduction surgery     H/O gastric sleeve May 2021    H/O total knee replacement, left 2015    History of removal of laparoscopic gastric banding device 2021    S/P cataract surgery, left     S/P  section     S/P hernia surgery 2021    Status post gastric banding 2005

## 2023-03-16 ENCOUNTER — TRANSCRIPTION ENCOUNTER (OUTPATIENT)
Age: 63
End: 2023-03-16

## 2023-03-16 VITALS
RESPIRATION RATE: 13 BRPM | SYSTOLIC BLOOD PRESSURE: 122 MMHG | DIASTOLIC BLOOD PRESSURE: 50 MMHG | OXYGEN SATURATION: 96 % | HEART RATE: 68 BPM | TEMPERATURE: 98 F

## 2023-03-16 PROCEDURE — 15830 EXC EXCESSIVE SKIN ABDOMEN: CPT

## 2023-03-16 PROCEDURE — 15877 SUCTION LIPECTOMY TRUNK: CPT

## 2023-03-16 PROCEDURE — C1889: CPT

## 2023-03-16 PROCEDURE — 22999 UNLISTED PX ABDOMEN MUSCSKEL: CPT

## 2023-03-16 PROCEDURE — C9399: CPT

## 2023-03-16 PROCEDURE — 15847 EXC SKIN ABD ADD-ON: CPT

## 2023-03-16 RX ORDER — INFLUENZA VIRUS VACCINE 15; 15; 15; 15 UG/.5ML; UG/.5ML; UG/.5ML; UG/.5ML
0.5 SUSPENSION INTRAMUSCULAR ONCE
Refills: 0 | Status: DISCONTINUED | OUTPATIENT
Start: 2023-03-16 | End: 2023-03-29

## 2023-03-16 RX ORDER — SERTRALINE 25 MG/1
100 TABLET, FILM COATED ORAL
Qty: 0 | Refills: 0 | DISCHARGE

## 2023-03-16 RX ORDER — ACETAMINOPHEN 500 MG
2 TABLET ORAL
Qty: 0 | Refills: 0 | DISCHARGE
Start: 2023-03-16

## 2023-03-16 RX ORDER — SERTRALINE 25 MG/1
1 TABLET, FILM COATED ORAL
Qty: 0 | Refills: 0 | DISCHARGE

## 2023-03-16 RX ADMIN — OXYCODONE HYDROCHLORIDE 5 MILLIGRAM(S): 5 TABLET ORAL at 12:12

## 2023-03-16 RX ADMIN — Medication 100 MILLIGRAM(S): at 08:40

## 2023-03-16 RX ADMIN — SERTRALINE 100 MILLIGRAM(S): 25 TABLET, FILM COATED ORAL at 13:37

## 2023-03-16 RX ADMIN — Medication 650 MILLIGRAM(S): at 14:11

## 2023-03-16 RX ADMIN — OXYCODONE HYDROCHLORIDE 10 MILLIGRAM(S): 5 TABLET ORAL at 01:21

## 2023-03-16 RX ADMIN — VALSARTAN 80 MILLIGRAM(S): 80 TABLET ORAL at 05:50

## 2023-03-16 RX ADMIN — OXYCODONE HYDROCHLORIDE 5 MILLIGRAM(S): 5 TABLET ORAL at 06:10

## 2023-03-16 RX ADMIN — SERTRALINE 50 MILLIGRAM(S): 25 TABLET, FILM COATED ORAL at 12:14

## 2023-03-16 RX ADMIN — OXYCODONE HYDROCHLORIDE 5 MILLIGRAM(S): 5 TABLET ORAL at 05:50

## 2023-03-16 RX ADMIN — OXYCODONE HYDROCHLORIDE 10 MILLIGRAM(S): 5 TABLET ORAL at 01:51

## 2023-03-16 RX ADMIN — Medication 650 MILLIGRAM(S): at 06:32

## 2023-03-16 RX ADMIN — OXYCODONE HYDROCHLORIDE 10 MILLIGRAM(S): 5 TABLET ORAL at 17:51

## 2023-03-16 RX ADMIN — Medication 100 MILLIGRAM(S): at 01:19

## 2023-03-16 NOTE — DISCHARGE NOTE PROVIDER - NSDCFUADDINST_GEN_ALL_CORE_FT
TAKE ALL MEDICATIONS AS PRESCRIBED BY DR. SHIKOWITZ-BEHR    EMPTY DRAINS TWICE A DAY, RECORD AMOUNT AND COLOR    KEEP DRESSINGS AND INCISIONS DRY, SPONGE BATHE ONLY    AMBULATE OFTEN    DRINK PLENTY OF WATER

## 2023-03-16 NOTE — DISCHARGE NOTE PROVIDER - NSDCMRMEDTOKEN_GEN_ALL_CORE_FT
acetaminophen 325 mg oral tablet: 2 tab(s) orally every 6 hours, As needed, Mild Pain (1 - 3)  rosuvastatin 5 mg oral tablet: 1 tab(s) orally once a day  valsartan-hydrochlorothiazide 80 mg-12.5 mg oral tablet: 1 tab(s) orally once a day

## 2023-03-16 NOTE — PROGRESS NOTE ADULT - ASSESSMENT
s/p abdominal body contouring surgery, POD 1  plan:  d/c home  discharge instructions reviewed with patient  signs and symptoms to monitor upon discharge also reviewed.

## 2023-03-16 NOTE — DISCHARGE NOTE NURSING/CASE MANAGEMENT/SOCIAL WORK - PATIENT PORTAL LINK FT
You can access the FollowMyHealth Patient Portal offered by [facility] by registering at the following website: [email or web address]. By joining [facility]’s FollowMyHealth portal, you will also be able to view your health information using other applications (apps) compatible with our system.

## 2023-03-16 NOTE — PROGRESS NOTE ADULT - SUBJECTIVE AND OBJECTIVE BOX
Problem: Falls - Risk of:  Goal: Will remain free from falls  Description: Will remain free from falls  3/8/2022 0827 by Jabier Victor RN  Outcome: Ongoing  3/8/2022 0455 by Leonidas Ellison RN  Outcome: Ongoing  Goal: Absence of physical injury  Description: Absence of physical injury  3/8/2022 0827 by Jabier Victor RN  Outcome: Ongoing  3/8/2022 0455 by Leonidas Ellison RN  Outcome: Ongoing     Problem: Airway Clearance - Ineffective  Goal: Achieve or maintain patent airway  3/8/2022 0827 by Jabier Victor RN  Outcome: Ongoing  3/8/2022 0455 by Leonidas Ellison RN  Outcome: Ongoing     Problem: Gas Exchange - Impaired  Goal: Absence of hypoxia  3/8/2022 0827 by Jabier Victor RN  Outcome: Ongoing  3/8/2022 0455 by Leonidas Ellison RN  Outcome: Ongoing  Goal: Promote optimal lung function  3/8/2022 0827 by Jabier Victor RN  Outcome: Ongoing  3/8/2022 0455 by Leonidas Ellison RN  Outcome: Ongoing     Problem: Breathing Pattern - Ineffective  Goal: Ability to achieve and maintain a regular respiratory rate  3/8/2022 0827 by Jabier Victor RN  Outcome: Ongoing  3/8/2022 0455 by Leonidas Ellison RN  Outcome: Ongoing     Problem:  Body Temperature -  Risk of, Imbalanced  Goal: Ability to maintain a body temperature within defined limits  3/8/2022 0827 by Jabier Victor RN  Outcome: Ongoing  3/8/2022 0455 by Leonidas Ellison RN  Outcome: Ongoing  Goal: Will regain or maintain usual level of consciousness  3/8/2022 0827 by Jabier Victor RN  Outcome: Ongoing  3/8/2022 0455 by Leonidas Ellison RN  Outcome: Ongoing  Goal: Complications related to the disease process, condition or treatment will be avoided or minimized  3/8/2022 0827 by Jabier Victor RN  Outcome: Ongoing  3/8/2022 0455 by Leonidas Ellison RN  Outcome: Ongoing     Problem: Isolation Precautions - Risk of Spread of Infection  Goal: Prevent transmission of infection  3/8/2022 0827 by Jabier Victor RN  Outcome: Ongoing  3/8/2022 0455 by Leonidas Ellison RN  Outcome: Ongoing Problem: Nutrition Deficits  Goal: Optimize nutritional status  3/8/2022 0827 by Keyla Ortega RN  Outcome: Ongoing  3/8/2022 0455 by Sherrie Muñoz RN  Outcome: Ongoing     Problem: Risk for Fluid Volume Deficit  Goal: Maintain normal heart rhythm  3/8/2022 0827 by Keyla Ortega RN  Outcome: Ongoing  3/8/2022 0455 by Sherrie Muñoz RN  Outcome: Ongoing  Goal: Maintain absence of muscle cramping  3/8/2022 0827 by Keyla Ortega RN  Outcome: Ongoing  3/8/2022 0455 by Sherrie Muñoz RN  Outcome: Ongoing  Goal: Maintain normal serum potassium, sodium, calcium, phosphorus, and pH  3/8/2022 0827 by Keyla Ortega RN  Outcome: Ongoing  3/8/2022 0455 by Sherrie Muñoz RN  Outcome: Ongoing     Problem: Loneliness or Risk for Loneliness  Goal: Demonstrate positive use of time alone when socialization is not possible  3/8/2022 0827 by Keyla Ortega RN  Outcome: Ongoing  3/8/2022 0455 by Sherrie Muñoz RN  Outcome: Ongoing     Problem: Fatigue  Goal: Verbalize increase energy and improved vitality  3/8/2022 0827 by Keyla Ortega RN  Outcome: Ongoing  3/8/2022 0455 by Sherrie Muñoz RN  Outcome: Ongoing     Problem: Patient Education: Go to Patient Education Activity  Goal: Patient/Family Education  3/8/2022 0827 by Keyla Ortega RN  Outcome: Ongoing  3/8/2022 0455 by Sherrie Muñoz RN  Outcome: Ongoing doing well  no cp, no sob, no fevers, no calf pain  on exam, the abdomen is soft. the incision is intact.   there is no evidence of skin ischemia or necrosis.  drain output is appropriate serosang.  there is no evidence of hematoma.

## 2023-03-16 NOTE — DISCHARGE NOTE PROVIDER - CARE PROVIDER_API CALL
ShikowitzBehr, Lauren B (MD)  Plastic Surgery  [address] [place] [address]  Phone: [phone]  Fax: [phone]  Follow Up Time:

## 2023-03-16 NOTE — DISCHARGE NOTE PROVIDER - NSDCFUSCHEDAPPT_GEN_ALL_CORE_FT
Shikowitz-Behr, Lauren B  [facility] Physician [facility]  PLASTICS[facility]  Scheduled Appointment: 03/24/2023

## 2023-03-16 NOTE — PATIENT PROFILE ADULT - FALL HARM RISK - HARM RISK INTERVENTIONS

## 2023-03-16 NOTE — PATIENT PROFILE ADULT - FUNCTIONAL ASSESSMENT - BASIC MOBILITY 6.
3-calculated by average/Not able to assess (calculate score using [facility] averaging method)

## 2023-03-16 NOTE — DISCHARGE NOTE PROVIDER - HOSPITAL COURSE
61 yo female POD #1 s/p abdominoplasty, panniculectomy. Pt stayed overnight for observation and pain management. She had an uneventful postoperative course, she was seen by Dr. Vega this morning and cleared for discharge home

## 2023-03-24 ENCOUNTER — APPOINTMENT (OUTPATIENT)
Dept: PLASTIC SURGERY | Facility: CLINIC | Age: 63
End: 2023-03-24
Payer: COMMERCIAL

## 2023-03-24 PROCEDURE — 99024 POSTOP FOLLOW-UP VISIT: CPT

## 2023-03-26 PROBLEM — E65 LOCALIZED ADIPOSITY: Chronic | Status: ACTIVE | Noted: 2023-03-10

## 2023-03-29 ENCOUNTER — APPOINTMENT (OUTPATIENT)
Dept: PLASTIC SURGERY | Facility: CLINIC | Age: 63
End: 2023-03-29
Payer: COMMERCIAL

## 2023-03-29 PROCEDURE — 99024 POSTOP FOLLOW-UP VISIT: CPT

## 2023-04-02 NOTE — HISTORY OF PRESENT ILLNESS
[FreeTextEntry1] : Yaima Chairez is a 63 y/o female s/p panniculectomy, lipo-abdominoplasty on 3/15/23. Patient c/o some discomfort but denies any significant pain. As per patient, left sided drain meets criteria for removal

## 2023-04-02 NOTE — PHYSICAL EXAM
[NI] : Normal [de-identified] : NAD, AxOx3 [de-identified] : nonlabored breathing  [de-identified] : soft, nondistended. Lower abdominal and umbilical incisions are clean, dry and intact. Umbilicus is viable. There is some expected swelling noted. Prineo dressing in place. There is no evidence of bleeding, infection or skin breakdown. Drains serosanguinous [de-identified] : normal affect  [de-identified] : no edema

## 2023-04-03 ENCOUNTER — APPOINTMENT (OUTPATIENT)
Dept: PLASTIC SURGERY | Facility: CLINIC | Age: 63
End: 2023-04-03
Payer: COMMERCIAL

## 2023-04-03 DIAGNOSIS — Z98.84 BARIATRIC SURGERY STATUS: ICD-10-CM

## 2023-04-03 PROCEDURE — 99024 POSTOP FOLLOW-UP VISIT: CPT

## 2023-04-10 ENCOUNTER — APPOINTMENT (OUTPATIENT)
Dept: PLASTIC SURGERY | Facility: CLINIC | Age: 63
End: 2023-04-10
Payer: COMMERCIAL

## 2023-04-10 PROCEDURE — 99024 POSTOP FOLLOW-UP VISIT: CPT

## 2023-04-10 NOTE — PHYSICAL EXAM
[NI] : Normal [de-identified] : NAD, AxOX3 [de-identified] : nonlabored breathing [de-identified] : incisions C/D/I, prineo removed\par umbilicus is viable\par drain removed without issue

## 2023-04-10 NOTE — HISTORY OF PRESENT ILLNESS
[FreeTextEntry1] : Yaima Chairez is a 63 y/o female s/p panniculectomy, lipo-abdominoplasty on 3/15/23. Remaining drain meets criteria for removal.

## 2023-04-16 ENCOUNTER — RX RENEWAL (OUTPATIENT)
Age: 63
End: 2023-04-16

## 2023-04-23 PROBLEM — Z98.84 S/P LAPAROSCOPIC SLEEVE GASTRECTOMY: Status: ACTIVE | Noted: 2021-05-03

## 2023-04-23 NOTE — HISTORY OF PRESENT ILLNESS
[FreeTextEntry1] : Yaima Chairez is a 63 y/o female s/p panniculectomy, lipo-abdominoplasty on 3/15/23. She presents today with superficial epidermolysis to the most lateral aspect of her abdominal incision. States that she notes bleeding. She believes this is wear the binder hits the incision. No other complaints. Feeling well overall.

## 2023-04-23 NOTE — PHYSICAL EXAM
[NI] : Normal [de-identified] : NAD, AxOX3 [de-identified] : nonlabored breathing [de-identified] : incisions C/D/I, very superficial wound separation at most lateral aspects of each incision. No active bleeding. No infection. \par umbilicus is viable\par drain removed without issue

## 2023-04-24 ENCOUNTER — APPOINTMENT (OUTPATIENT)
Dept: PLASTIC SURGERY | Facility: CLINIC | Age: 63
End: 2023-04-24
Payer: COMMERCIAL

## 2023-04-24 DIAGNOSIS — E65 LOCALIZED ADIPOSITY: ICD-10-CM

## 2023-04-24 DIAGNOSIS — R63.4 ABNORMAL WEIGHT LOSS: ICD-10-CM

## 2023-04-24 PROCEDURE — 99024 POSTOP FOLLOW-UP VISIT: CPT

## 2023-05-05 NOTE — PHYSICAL EXAM
[NI] : Normal [de-identified] : NAD, AxOx3 [de-identified] : nonlabored breathing  [de-identified] : soft, nondistended. Lower abdominal and umbilical incisions are clean, dry and intact. Umbilicus is viable. Minimal swelling still present. The right lateral incision with very superficial epidermolysis. Left lateral incision is now closed.  [de-identified] : no edema  [de-identified] : normal affect

## 2023-05-05 NOTE — HISTORY OF PRESENT ILLNESS
[FreeTextEntry1] : Yaima Chairez is a 63 y/o female s/p panniculectomy, lipo-abdominoplasty on 3/15/23.Patient has no complaints today. She is pleased with her results. She has been applying dry gauze as needed to breasts.

## 2023-05-11 ENCOUNTER — RX RENEWAL (OUTPATIENT)
Age: 63
End: 2023-05-11

## 2023-05-12 ENCOUNTER — RX RENEWAL (OUTPATIENT)
Age: 63
End: 2023-05-12

## 2023-05-16 PROBLEM — R63.4 WEIGHT LOSS: Status: ACTIVE | Noted: 2020-11-05

## 2023-05-16 PROBLEM — E65 ABDOMINAL PANNUS: Status: ACTIVE | Noted: 2022-08-09

## 2023-05-16 NOTE — PHYSICAL EXAM
[NI] : Normal [de-identified] : NAD, AxOx3 [de-identified] : nonlabored breathing  [de-identified] : soft, nondistended. Lower abdominal and umbilical incisions are well healed. [de-identified] : no edema  [de-identified] : normal affect

## 2023-05-16 NOTE — HISTORY OF PRESENT ILLNESS
[FreeTextEntry1] : Yaima Chairez is a 63 y/o female s/p panniculectomy, lipo-abdominoplasty on 3/15/23.Patient has no complaints today. She is pleased with her results.

## 2023-06-13 ENCOUNTER — RX RENEWAL (OUTPATIENT)
Age: 63
End: 2023-06-13

## 2023-06-22 NOTE — H&P PST ADULT - TEMPERATURE IN CELSIUS (DEGREES C)
Pre-Appointment Document Gathering    Intake Questions:  o Does your child have any existing medical conditions or prior hospitalizations? Yes - solid organ transplant  o Have they been evaluated in the past either by a clinician, mental health provider, or school? Yes - ASD diagnosed by neuropsych evaluation  o What are you looking for from this evaluation? Treatment recommendations, medication management      Intake Screeening:    Appointment Type Placement: DBP    Wait time quote (if applicable): Scheduled immediately     Rationale/Notes: Patient has fine motor delay, speech delay, gross motor delay. Also diagnosed with ASD and ADHD.      Logistics:  Patient would like to receive their intake paperwork via [employer]    Email consent? yes    Will the family need an ? no    Intake Paperwork Documentation  Document  Date sent to family Date received and sent to scanning   [facility] Demographics     ROIs to Collect     ROIs/Consent to communicate as indicated by ROIs to Collect form     Medical History     School and Intervention History     Behavioral and Mental Health History     Questionnaires (indicate type in the sent/received column) [] [facility] Parent     [] [facility] Teacher     [] BRIEF Parent     [] BRIEF Teacher     [] [place] Parent     [] [facility] Teacher     [] Other:      Release of Information Collection / Records received  *If records received from a location without an KAYLEE on file please still document receipt in this chart*  School/Service/Therapist/etc.  Family Returned signed KAYLEE Sent Request Received/Sent to HIM scanning Where in the chart?                                                      
36.7

## 2023-08-13 ENCOUNTER — RX RENEWAL (OUTPATIENT)
Age: 63
End: 2023-08-13

## 2023-10-20 NOTE — DISCHARGE NOTE NURSING/CASE MANAGEMENT/SOCIAL WORK - NSDCPETBCESMAN_GEN_ALL_CORE
What Type Of Note Output Would You Prefer (Optional)?: Bullet Format Is The Patient Presenting As Previously Scheduled?: Yes How Severe Is Your Rash?: moderate Detail Level: Detailed Is This A New Presentation, Or A Follow-Up?: Rash General Sunscreen Counseling: I recommended a broad spectrum sunscreen with a SPF of 30 or higher. I explained that SPF 30 sunscreens block approximately 97 percent of the sun's harmful rays. Sunscreens should be applied at least 15 minutes prior to expected sun exposure and then every 2 hours after that as long as sun exposure continues. If swimming or exercising sunscreen should be reapplied every 45 minutes to an hour after getting wet or sweating. One ounce, or the equivalent of a shot glass full of sunscreen, is adequate to protect the skin not covered by a bathing suit. I also recommended a lip balm with a sunscreen as well. Sun protective clothing can be used in lieu of sunscreen but must be worn the entire time you are exposed to the sun's rays. If you are a smoker, it is important for your health to stop smoking. Please be aware that second hand smoke is also harmful.

## 2023-11-18 ENCOUNTER — RX RENEWAL (OUTPATIENT)
Age: 63
End: 2023-11-18

## 2023-11-19 ENCOUNTER — TRANSCRIPTION ENCOUNTER (OUTPATIENT)
Age: 63
End: 2023-11-19

## 2023-12-18 ENCOUNTER — RX RENEWAL (OUTPATIENT)
Age: 63
End: 2023-12-18

## 2024-01-17 ENCOUNTER — APPOINTMENT (OUTPATIENT)
Dept: INTERNAL MEDICINE | Facility: CLINIC | Age: 64
End: 2024-01-17
Payer: COMMERCIAL

## 2024-01-17 DIAGNOSIS — U07.1 COVID-19: ICD-10-CM

## 2024-01-17 PROCEDURE — 99442: CPT

## 2024-01-17 RX ORDER — OXYCODONE 5 MG/1
5 TABLET ORAL
Qty: 24 | Refills: 0 | Status: DISCONTINUED | COMMUNITY
Start: 2023-03-24 | End: 2024-01-17

## 2024-01-17 RX ORDER — ONDANSETRON 4 MG/1
4 TABLET, ORALLY DISINTEGRATING ORAL
Qty: 9 | Refills: 0 | Status: DISCONTINUED | COMMUNITY
Start: 2023-03-11 | End: 2024-01-17

## 2024-01-17 RX ORDER — CEFADROXIL 500 MG/1
500 CAPSULE ORAL TWICE DAILY
Qty: 14 | Refills: 0 | Status: DISCONTINUED | COMMUNITY
Start: 2023-03-11 | End: 2024-01-17

## 2024-01-17 RX ORDER — DIAZEPAM 5 MG/1
5 TABLET ORAL 3 TIMES DAILY
Qty: 12 | Refills: 0 | Status: DISCONTINUED | COMMUNITY
Start: 2023-03-24 | End: 2024-01-17

## 2024-01-17 RX ORDER — OXYCODONE AND ACETAMINOPHEN 5; 325 MG/1; MG/1
5-325 TABLET ORAL
Qty: 24 | Refills: 0 | Status: DISCONTINUED | COMMUNITY
Start: 2023-03-11 | End: 2024-01-17

## 2024-01-17 RX ORDER — DIAZEPAM 5 MG/1
5 TABLET ORAL 3 TIMES DAILY
Qty: 15 | Refills: 0 | Status: DISCONTINUED | COMMUNITY
Start: 2023-03-15 | End: 2024-01-17

## 2024-01-22 ENCOUNTER — NON-APPOINTMENT (OUTPATIENT)
Age: 64
End: 2024-01-22

## 2024-01-22 ENCOUNTER — TRANSCRIPTION ENCOUNTER (OUTPATIENT)
Age: 64
End: 2024-01-22

## 2024-02-22 ENCOUNTER — NON-APPOINTMENT (OUTPATIENT)
Age: 64
End: 2024-02-22

## 2024-04-01 ENCOUNTER — NON-APPOINTMENT (OUTPATIENT)
Age: 64
End: 2024-04-01

## 2024-04-01 ENCOUNTER — APPOINTMENT (OUTPATIENT)
Dept: INTERNAL MEDICINE | Facility: CLINIC | Age: 64
End: 2024-04-01
Payer: COMMERCIAL

## 2024-04-01 VITALS
BODY MASS INDEX: 36.82 KG/M2 | DIASTOLIC BLOOD PRESSURE: 80 MMHG | OXYGEN SATURATION: 98 % | RESPIRATION RATE: 14 BRPM | WEIGHT: 221 LBS | TEMPERATURE: 99.1 F | HEIGHT: 65 IN | SYSTOLIC BLOOD PRESSURE: 110 MMHG | HEART RATE: 88 BPM

## 2024-04-01 DIAGNOSIS — Z00.00 ENCOUNTER FOR GENERAL ADULT MEDICAL EXAMINATION W/OUT ABNORMAL FINDINGS: ICD-10-CM

## 2024-04-01 DIAGNOSIS — R92.30 DENSE BREASTS, UNSPECIFIED: ICD-10-CM

## 2024-04-01 DIAGNOSIS — E78.00 PURE HYPERCHOLESTEROLEMIA, UNSPECIFIED: ICD-10-CM

## 2024-04-01 PROCEDURE — 93000 ELECTROCARDIOGRAM COMPLETE: CPT

## 2024-04-01 PROCEDURE — 99396 PREV VISIT EST AGE 40-64: CPT

## 2024-04-01 RX ORDER — VALSARTAN AND HYDROCHLOROTHIAZIDE 80; 12.5 MG/1; MG/1
80-12.5 TABLET, FILM COATED ORAL
Qty: 30 | Refills: 5 | Status: ACTIVE | COMMUNITY
Start: 2021-06-21 | End: 1900-01-01

## 2024-04-01 RX ORDER — SERTRALINE HYDROCHLORIDE 100 MG/1
100 TABLET, FILM COATED ORAL
Qty: 30 | Refills: 5 | Status: ACTIVE | COMMUNITY
Start: 2018-10-29 | End: 1900-01-01

## 2024-04-01 RX ORDER — SERTRALINE HYDROCHLORIDE 50 MG/1
50 TABLET, FILM COATED ORAL
Qty: 30 | Refills: 5 | Status: ACTIVE | COMMUNITY
Start: 2019-02-26 | End: 1900-01-01

## 2024-04-01 RX ORDER — NIRMATRELVIR AND RITONAVIR 300-100 MG
20 X 150 MG & KIT ORAL
Qty: 1 | Refills: 0 | Status: DISCONTINUED | COMMUNITY
Start: 2024-01-17 | End: 2024-04-01

## 2024-04-01 NOTE — PHYSICAL EXAM
[No Acute Distress] : no acute distress [Well Nourished] : well nourished [Well Developed] : well developed [Normal Sclera/Conjunctiva] : normal sclera/conjunctiva [Well-Appearing] : well-appearing [PERRL] : pupils equal round and reactive to light [Normal Outer Ear/Nose] : the outer ears and nose were normal in appearance [EOMI] : extraocular movements intact [Normal Oropharynx] : the oropharynx was normal [No JVD] : no jugular venous distention [No Lymphadenopathy] : no lymphadenopathy [Supple] : supple [Thyroid Normal, No Nodules] : the thyroid was normal and there were no nodules present [No Respiratory Distress] : no respiratory distress  [No Accessory Muscle Use] : no accessory muscle use [Clear to Auscultation] : lungs were clear to auscultation bilaterally [Normal Rate] : normal rate  [Regular Rhythm] : with a regular rhythm [Normal S1, S2] : normal S1 and S2 [No Murmur] : no murmur heard [No Carotid Bruits] : no carotid bruits [No Abdominal Bruit] : a ~M bruit was not heard ~T in the abdomen [No Varicosities] : no varicosities [Pedal Pulses Present] : the pedal pulses are present [No Edema] : there was no peripheral edema [No Palpable Aorta] : no palpable aorta [No Extremity Clubbing/Cyanosis] : no extremity clubbing/cyanosis [Normal Appearance] : normal in appearance [Soft] : abdomen soft [Non Tender] : non-tender [Non-distended] : non-distended [No Masses] : no abdominal mass palpated [No HSM] : no HSM [Normal Bowel Sounds] : normal bowel sounds [Normal Anterior Cervical Nodes] : no anterior cervical lymphadenopathy [Normal Posterior Cervical Nodes] : no posterior cervical lymphadenopathy [No CVA Tenderness] : no CVA  tenderness [No Spinal Tenderness] : no spinal tenderness [No Joint Swelling] : no joint swelling [Grossly Normal Strength/Tone] : grossly normal strength/tone [No Rash] : no rash [Coordination Grossly Intact] : coordination grossly intact [No Focal Deficits] : no focal deficits [Normal Gait] : normal gait [Deep Tendon Reflexes (DTR)] : deep tendon reflexes were 2+ and symmetric [Normal Affect] : the affect was normal [Normal Insight/Judgement] : insight and judgment were intact [de-identified] : s/p breast reduction

## 2024-04-01 NOTE — REVIEW OF SYSTEMS
[Negative] : Psychiatric Partial Purse String (Simple) Text: Given the location of the defect and the characteristics of the surrounding skin a simple purse string closure was deemed most appropriate.  Undermining was performed circumfirentially around the surgical defect.  A purse string suture was then placed and tightened. Wound tension only allowed a partial closure of the circular defect.

## 2024-04-01 NOTE — HEALTH RISK ASSESSMENT
[No] : No [Little interest or pleasure doing things] : 1) Little interest or pleasure doing things [Feeling down, depressed, or hopeless] : 2) Feeling down, depressed, or hopeless [0] : 2) Feeling down, depressed, or hopeless: Not at all (0) [PHQ-2 Negative - No further assessment needed] : PHQ-2 Negative - No further assessment needed [Patient reported colonoscopy was normal] : Patient reported colonoscopy was normal [Never] : Never [[id number]] : 0 [ColonoscopyDate] : 06/15

## 2024-04-03 RX ORDER — TIRZEPATIDE 5 MG/.5ML
5 INJECTION, SOLUTION SUBCUTANEOUS
Qty: 1 | Refills: 2 | Status: ACTIVE | COMMUNITY
Start: 2024-04-01 | End: 1900-01-01

## 2024-04-06 LAB
25(OH)D3 SERPL-MCNC: 36.4 NG/ML
ALBUMIN SERPL ELPH-MCNC: 4.3 G/DL
ALP BLD-CCNC: 97 U/L
ALT SERPL-CCNC: 20 U/L
ANION GAP SERPL CALC-SCNC: 11 MMOL/L
APPEARANCE: CLEAR
AST SERPL-CCNC: 23 U/L
BACTERIA: NEGATIVE /HPF
BASOPHILS # BLD AUTO: 0.04 K/UL
BASOPHILS NFR BLD AUTO: 0.8 %
BILIRUB SERPL-MCNC: 0.2 MG/DL
BILIRUBIN URINE: NEGATIVE
BLOOD URINE: NEGATIVE
BUN SERPL-MCNC: 17 MG/DL
CALCIUM SERPL-MCNC: 9.9 MG/DL
CAST: 0 /LPF
CHLORIDE SERPL-SCNC: 103 MMOL/L
CHOLEST SERPL-MCNC: 187 MG/DL
CO2 SERPL-SCNC: 28 MMOL/L
COLOR: YELLOW
CREAT SERPL-MCNC: 1.04 MG/DL
EGFR: 61 ML/MIN/1.73M2
EOSINOPHIL # BLD AUTO: 0.07 K/UL
EOSINOPHIL NFR BLD AUTO: 1.4 %
EPITHELIAL CELLS: 1 /HPF
ESTIMATED AVERAGE GLUCOSE: 114 MG/DL
FOLATE SERPL-MCNC: 9.7 NG/ML
GLUCOSE QUALITATIVE U: NEGATIVE MG/DL
GLUCOSE SERPL-MCNC: 79 MG/DL
HBA1C MFR BLD HPLC: 5.6 %
HCT VFR BLD CALC: 40 %
HDLC SERPL-MCNC: 68 MG/DL
HGB BLD-MCNC: 12.3 G/DL
IMM GRANULOCYTES NFR BLD AUTO: 0 %
KETONES URINE: NEGATIVE MG/DL
LDLC SERPL CALC-MCNC: 109 MG/DL
LEUKOCYTE ESTERASE URINE: ABNORMAL
LYMPHOCYTES # BLD AUTO: 2.09 K/UL
LYMPHOCYTES NFR BLD AUTO: 40.8 %
MAN DIFF?: NORMAL
MCHC RBC-ENTMCNC: 25.7 PG
MCHC RBC-ENTMCNC: 30.8 GM/DL
MCV RBC AUTO: 83.5 FL
MICROSCOPIC-UA: NORMAL
MONOCYTES # BLD AUTO: 0.45 K/UL
MONOCYTES NFR BLD AUTO: 8.8 %
NEUTROPHILS # BLD AUTO: 2.47 K/UL
NEUTROPHILS NFR BLD AUTO: 48.2 %
NITRITE URINE: NEGATIVE
NONHDLC SERPL-MCNC: 119 MG/DL
PH URINE: 7
PLATELET # BLD AUTO: 281 K/UL
POTASSIUM SERPL-SCNC: 4.4 MMOL/L
PROT SERPL-MCNC: 7.3 G/DL
PROTEIN URINE: NEGATIVE MG/DL
RBC # BLD: 4.79 M/UL
RBC # FLD: 15.6 %
RED BLOOD CELLS URINE: 2 /HPF
SODIUM SERPL-SCNC: 142 MMOL/L
SPECIFIC GRAVITY URINE: 1.02
TRIGL SERPL-MCNC: 51 MG/DL
TSH SERPL-ACNC: 1.7 UIU/ML
URATE SERPL-MCNC: 4.6 MG/DL
UROBILINOGEN URINE: 0.2 MG/DL
VIT B12 SERPL-MCNC: 787 PG/ML
WBC # FLD AUTO: 5.12 K/UL
WHITE BLOOD CELLS URINE: 3 /HPF

## 2024-04-29 ENCOUNTER — RESULT REVIEW (OUTPATIENT)
Age: 64
End: 2024-04-29

## 2024-04-29 ENCOUNTER — APPOINTMENT (OUTPATIENT)
Dept: ULTRASOUND IMAGING | Facility: CLINIC | Age: 64
End: 2024-04-29
Payer: COMMERCIAL

## 2024-04-29 ENCOUNTER — OUTPATIENT (OUTPATIENT)
Dept: OUTPATIENT SERVICES | Facility: HOSPITAL | Age: 64
LOS: 1 days | End: 2024-04-29
Payer: COMMERCIAL

## 2024-04-29 ENCOUNTER — APPOINTMENT (OUTPATIENT)
Dept: MAMMOGRAPHY | Facility: CLINIC | Age: 64
End: 2024-04-29
Payer: COMMERCIAL

## 2024-04-29 DIAGNOSIS — Z98.42 CATARACT EXTRACTION STATUS, LEFT EYE: Chronic | ICD-10-CM

## 2024-04-29 DIAGNOSIS — Z00.8 ENCOUNTER FOR OTHER GENERAL EXAMINATION: ICD-10-CM

## 2024-04-29 DIAGNOSIS — Z96.652 PRESENCE OF LEFT ARTIFICIAL KNEE JOINT: Chronic | ICD-10-CM

## 2024-04-29 DIAGNOSIS — Z98.84 BARIATRIC SURGERY STATUS: Chronic | ICD-10-CM

## 2024-04-29 DIAGNOSIS — Z98.890 OTHER SPECIFIED POSTPROCEDURAL STATES: Chronic | ICD-10-CM

## 2024-04-29 DIAGNOSIS — Z90.3 ACQUIRED ABSENCE OF STOMACH [PART OF]: Chronic | ICD-10-CM

## 2024-04-29 DIAGNOSIS — Z98.89 OTHER SPECIFIED POSTPROCEDURAL STATES: Chronic | ICD-10-CM

## 2024-04-29 DIAGNOSIS — R92.30 DENSE BREASTS, UNSPECIFIED: ICD-10-CM

## 2024-04-29 DIAGNOSIS — Z00.00 ENCOUNTER FOR GENERAL ADULT MEDICAL EXAMINATION WITHOUT ABNORMAL FINDINGS: ICD-10-CM

## 2024-04-29 PROCEDURE — 77067 SCR MAMMO BI INCL CAD: CPT | Mod: 26

## 2024-04-29 PROCEDURE — 77063 BREAST TOMOSYNTHESIS BI: CPT | Mod: 26

## 2024-04-29 PROCEDURE — 76641 ULTRASOUND BREAST COMPLETE: CPT | Mod: 26,50

## 2024-04-29 PROCEDURE — 76641 ULTRASOUND BREAST COMPLETE: CPT

## 2024-04-29 PROCEDURE — 77067 SCR MAMMO BI INCL CAD: CPT

## 2024-04-29 PROCEDURE — 77063 BREAST TOMOSYNTHESIS BI: CPT

## 2024-05-29 ENCOUNTER — APPOINTMENT (OUTPATIENT)
Dept: OBGYN | Facility: CLINIC | Age: 64
End: 2024-05-29
Payer: COMMERCIAL

## 2024-05-29 ENCOUNTER — NON-APPOINTMENT (OUTPATIENT)
Age: 64
End: 2024-05-29

## 2024-05-29 VITALS
BODY MASS INDEX: 35.92 KG/M2 | SYSTOLIC BLOOD PRESSURE: 122 MMHG | WEIGHT: 215.6 LBS | HEIGHT: 65 IN | DIASTOLIC BLOOD PRESSURE: 76 MMHG

## 2024-05-29 DIAGNOSIS — Z01.419 ENCOUNTER FOR GYNECOLOGICAL EXAMINATION (GENERAL) (ROUTINE) W/OUT ABNORMAL FINDINGS: ICD-10-CM

## 2024-05-29 PROCEDURE — 99386 PREV VISIT NEW AGE 40-64: CPT

## 2024-05-29 PROCEDURE — 99459 PELVIC EXAMINATION: CPT

## 2024-05-29 NOTE — PHYSICAL EXAM
[Chaperone Present] : A chaperone was present in the examining room during all aspects of the physical examination [[id number]] : A chaperone was present during the pelvic exam. [Appropriately responsive] : appropriately responsive [Alert] : alert [No Acute Distress] : no acute distress [Soft] : soft [Non-tender] : non-tender [Non-distended] : non-distended [No HSM] : No HSM [No Lesions] : no lesions [No Mass] : no mass [Oriented x3] : oriented x3 [Examination Of The Breasts] : a normal appearance [No Masses] : no breast masses were palpable [Labia Majora] : normal [Labia Minora] : normal [Normal] : normal [Uterine Adnexae] : normal [FreeTextEntry8] : negative

## 2024-05-29 NOTE — PLAN
[FreeTextEntry1] : Well woman visit completed Mammo uptodate continue routine GYN care total hip protocol. evaluate and treat

## 2024-05-29 NOTE — HISTORY OF PRESENT ILLNESS
[postmenopausal] : postmenopausal [Y] : Positive pregnancy history [Menopause Age: ____] : age at menopause was [unfilled] [No] : Patient does not have concerns regarding sex [Currently Active] : currently active [Men] : men [Hot Flashes] : hot flashes [TextBox_4] : Well woman visit  No complaints.  [Mammogramdate] : 4/29/24 [TextBox_19] : BR2 [BreastSonogramDate] : 4/29/24 [TextBox_25] : BR2 [PapSmeardate] : 2019 [TextBox_31] : per pt [ColonoscopyDate] : 11/18/20 [TextBox_43] : per pt [LMPDate] : 2012 [PGHxTotal] : 4 [[facility] [[facility] : 4 [FreeTextEntry1] : 2012 [FreeTextEntry4] : Denies [id number]

## 2024-06-03 LAB
CYTOLOGY CVX/VAG DOC THIN PREP: ABNORMAL
HPV HIGH+LOW RISK DNA PNL CVX: NOT DETECTED

## 2024-08-22 ENCOUNTER — NON-APPOINTMENT (OUTPATIENT)
Age: 64
End: 2024-08-22

## 2025-01-14 ENCOUNTER — TRANSCRIPTION ENCOUNTER (OUTPATIENT)
Age: 65
End: 2025-01-14

## 2025-05-06 ENCOUNTER — RX RENEWAL (OUTPATIENT)
Age: 65
End: 2025-05-06

## 2025-05-10 ENCOUNTER — TRANSCRIPTION ENCOUNTER (OUTPATIENT)
Age: 65
End: 2025-05-10

## 2025-06-04 ENCOUNTER — APPOINTMENT (OUTPATIENT)
Dept: OBGYN | Facility: CLINIC | Age: 65
End: 2025-06-04

## 2025-06-16 ENCOUNTER — RX RENEWAL (OUTPATIENT)
Age: 65
End: 2025-06-16

## (undated) DEVICE — DRSG DERMABOND PRINEO 60CM

## (undated) DEVICE — ABDOMINAL BINDER MED/LG 12" X 45"-62"

## (undated) DEVICE — SUT QUILL PDO 1 30CM T9 DA

## (undated) DEVICE — SOL IRR POUR NS 0.9% 1000ML

## (undated) DEVICE — ELCTR BOVIE PENCIL SMOKE EVACUATION

## (undated) DEVICE — DRSG CURITY GAUZE SPONGE 4 X 4" 12-PLY

## (undated) DEVICE — DRAIN JACKSON PRATT 10MM FLAT FULL NO TROCAR

## (undated) DEVICE — PREP BETADINE KIT

## (undated) DEVICE — DRSG XEROFORM 5 X 9"

## (undated) DEVICE — SUT POLYSORB 3-0 30" V-20 UNDYED

## (undated) DEVICE — TUBING MICROAIRE ASPIRATION SET 12FT

## (undated) DEVICE — SOL IRR NS 0.9% 250ML

## (undated) DEVICE — DRSG TOPIFOAM

## (undated) DEVICE — ELCTR EDGE BOVIE INSULATED BLADE TIP 6.5"

## (undated) DEVICE — SYR LUER LOK 50CC

## (undated) DEVICE — NDL SPINAL 22G X 3.5"

## (undated) DEVICE — DRSG KERLIX ROLL 4.5"

## (undated) DEVICE — Device

## (undated) DEVICE — SOL IRR POUR H2O 500ML

## (undated) DEVICE — SUT MONOSOF 2-0 18" C-15

## (undated) DEVICE — VENODYNE/SCD SLEEVE CALF MEDIUM

## (undated) DEVICE — PACK MAJOR ABDOMINAL WITH LAP

## (undated) DEVICE — VENODYNE/SCD SLEEVE CALF LARGE

## (undated) DEVICE — DRAPE TOWEL BLUE 17" X 24"

## (undated) DEVICE — DRAPE 3/4 SHEET W REINFORCEMENT 56X77"

## (undated) DEVICE — NDL HYPO SAFE 22G X 1.5" (BLACK)

## (undated) DEVICE — WARMING BLANKET UPPER ADULT

## (undated) DEVICE — SUT VLOC 180 3-0 6" P-12 UNDYED

## (undated) DEVICE — ONETRAC LIGHTED RETRACTOR 135 X 30MM DISP

## (undated) DEVICE — SYR LUER LOK 20CC

## (undated) DEVICE — BLANKET WARMER FULL ADULT

## (undated) DEVICE — GLV 8 PROTEXIS

## (undated) DEVICE — SUT SOFSILK 2-0 18" C-15

## (undated) DEVICE — SUT PDS II 0 36" CT-1

## (undated) DEVICE — TUBING INFILTRATION

## (undated) DEVICE — SOL IRR POUR NS 0.9% 500ML

## (undated) DEVICE — DRAIN BLAKE 15FR ROUND

## (undated) DEVICE — BAG DECANTER DISP

## (undated) DEVICE — STAPLER SKIN VISI-STAT 35 WIDE

## (undated) DEVICE — SPONGE LAP PAD W RING 18X18"

## (undated) DEVICE — DRAPE 3/4 SHEET 52X76"

## (undated) DEVICE — ELCTR GROUNDING PAD ADULT COVIDIEN

## (undated) DEVICE — FOLEY TRAY 16FR LF URINE METER SURESTEP

## (undated) DEVICE — WRAP COMPRESSION CALF MED

## (undated) DEVICE — LABEL MED BLANK W PEN

## (undated) DEVICE — BLADE SURGICAL #15 CARBON

## (undated) DEVICE — DRSG COMBINE 5X9"

## (undated) DEVICE — SUT TICRON 1 30" GS-21

## (undated) DEVICE — SUT MONOSOF 3-0 18" P-12

## (undated) DEVICE — ELCTR EDGE BOVIE INSULATED BLADE TIP 2.75"

## (undated) DEVICE — SUT POLYSORB 2-0 30" GS-21 UNDYED

## (undated) DEVICE — DRAIN RESERVOIR FOR JACKSON PRATT 100CC CARDINAL

## (undated) DEVICE — SUT ETHILON 5-0 18" P-3

## (undated) DEVICE — CANISTER DISPOSABLE THIN WALL 3000CC

## (undated) DEVICE — SUT VICRYL 2-0 27" SH UNDYED

## (undated) DEVICE — POSITIONER STRAP ARMBOARD VELCRO TS-30 12

## (undated) DEVICE — BLADE SURGICAL #10 CARBON

## (undated) DEVICE — SUT VICRYL 3-0 18" PS-2 UNDYED

## (undated) DEVICE — MARKING PEN W RULER

## (undated) DEVICE — SUT VLOC 180 3-0 18" P-12 UNDYED

## (undated) DEVICE — DOUBLE BASIN SET

## (undated) DEVICE — SUT MONOCRYL 3-0 27" PS-2 UNDYED

## (undated) DEVICE — SUT MONOCRYL 2-0 27" UR-6

## (undated) DEVICE — ABDOMINAL BINDER MED/LG 9" X 36"-64"

## (undated) DEVICE — DRSG STERISTRIPS 0.5X4"

## (undated) DEVICE — VISTASEAL DUAL APPLICATOR

## (undated) DEVICE — SUT NYLON 2-0 18" FS

## (undated) DEVICE — GLV 6.5 PROTEXIS (WHITE)

## (undated) DEVICE — NDL SPINAL 20G X 3.5"

## (undated) DEVICE — DRSG MAMMARY SUPPORT LG SIZE 4

## (undated) DEVICE — DRSG 4X4

## (undated) DEVICE — SUT VICRYL 3-0 27" SH UNDYED

## (undated) DEVICE — SUT MONOCRYL 4-0 27" PS-2 UNDYED

## (undated) DEVICE — SUT MERSILENE 0 30" CT-1

## (undated) DEVICE — LIGASURE IMPACT

## (undated) DEVICE — SUT PLAIN GUT FAST ABSORBING 5-0 PC-1